# Patient Record
Sex: MALE | Race: WHITE | Employment: UNEMPLOYED | URBAN - METROPOLITAN AREA
[De-identification: names, ages, dates, MRNs, and addresses within clinical notes are randomized per-mention and may not be internally consistent; named-entity substitution may affect disease eponyms.]

---

## 2018-09-19 ENCOUNTER — HOSPITAL ENCOUNTER (INPATIENT)
Age: 43
LOS: 4 days | Discharge: PSYCHIATRIC OR SUBSTANCE USE FACILITY | DRG: 071 | End: 2018-09-23
Attending: EMERGENCY MEDICINE | Admitting: INTERNAL MEDICINE
Payer: MEDICARE

## 2018-09-19 ENCOUNTER — APPOINTMENT (OUTPATIENT)
Dept: CT IMAGING | Age: 43
DRG: 071 | End: 2018-09-19
Attending: EMERGENCY MEDICINE
Payer: MEDICARE

## 2018-09-19 ENCOUNTER — APPOINTMENT (OUTPATIENT)
Dept: GENERAL RADIOLOGY | Age: 43
DRG: 071 | End: 2018-09-19
Attending: EMERGENCY MEDICINE
Payer: MEDICARE

## 2018-09-19 ENCOUNTER — HOSPITAL ENCOUNTER (EMERGENCY)
Age: 43
Discharge: HOME | DRG: 071 | End: 2018-09-19
Attending: EMERGENCY MEDICINE
Payer: MEDICARE

## 2018-09-19 VITALS
WEIGHT: 175 LBS | DIASTOLIC BLOOD PRESSURE: 66 MMHG | HEIGHT: 66 IN | SYSTOLIC BLOOD PRESSURE: 124 MMHG | BODY MASS INDEX: 28.12 KG/M2 | HEART RATE: 80 BPM | RESPIRATION RATE: 17 BRPM | OXYGEN SATURATION: 95 % | TEMPERATURE: 98.3 F

## 2018-09-19 DIAGNOSIS — E16.2 HYPOGLYCEMIA: Primary | ICD-10-CM

## 2018-09-19 DIAGNOSIS — R41.82 ALTERED MENTAL STATUS, UNSPECIFIED ALTERED MENTAL STATUS TYPE: ICD-10-CM

## 2018-09-19 DIAGNOSIS — R41.0 DISORIENTATION: Primary | ICD-10-CM

## 2018-09-19 DIAGNOSIS — R44.3 HALLUCINATIONS: ICD-10-CM

## 2018-09-19 PROBLEM — E11.9 TYPE 2 DIABETES MELLITUS (HCC): Chronic | Status: ACTIVE | Noted: 2018-09-19

## 2018-09-19 PROBLEM — R74.8 ELEVATED LIVER ENZYMES: Status: ACTIVE | Noted: 2018-09-19

## 2018-09-19 PROBLEM — G93.40 ACUTE ENCEPHALOPATHY: Status: ACTIVE | Noted: 2018-09-19

## 2018-09-19 PROBLEM — R06.89 HYPERCARBIA: Status: ACTIVE | Noted: 2018-09-19

## 2018-09-19 PROBLEM — F32.A DEPRESSION: Chronic | Status: ACTIVE | Noted: 2018-09-19

## 2018-09-19 LAB
ALBUMIN SERPL-MCNC: 4.1 G/DL (ref 3.5–5)
ALBUMIN SERPL-MCNC: 4.2 G/DL (ref 3.5–5)
ALBUMIN/GLOB SERPL: 1 {RATIO}
ALBUMIN/GLOB SERPL: 1 {RATIO} (ref 1.2–3.5)
ALP SERPL-CCNC: 104 U/L (ref 50–136)
ALP SERPL-CCNC: 92 U/L (ref 50–136)
ALT SERPL-CCNC: 95 U/L (ref 12–65)
ALT SERPL-CCNC: 98 U/L (ref 12–65)
AMMONIA PLAS-SCNC: 16 UMOL/L (ref 11–32)
AMMONIA PLAS-SCNC: 16 UMOL/L (ref 24.9–68)
AMPHET UR QL SCN: NEGATIVE
ANION GAP SERPL CALC-SCNC: 6 MMOL/L
ANION GAP SERPL CALC-SCNC: 7 MMOL/L (ref 7–16)
APAP SERPL-MCNC: <2 UG/ML (ref 10–30)
AST SERPL-CCNC: 49 U/L (ref 15–37)
AST SERPL-CCNC: 67 U/L (ref 15–37)
ATRIAL RATE: 89 BPM
BARBITURATES UR QL SCN: NEGATIVE
BASOPHILS # BLD: 0.1 K/UL (ref 0–0.2)
BASOPHILS # BLD: 0.1 K/UL (ref 0–0.2)
BASOPHILS NFR BLD: 1 % (ref 0–2)
BASOPHILS NFR BLD: 1 % (ref 0–2)
BENZODIAZ UR QL: NEGATIVE
BILIRUB DIRECT SERPL-MCNC: 0.2 MG/DL
BILIRUB SERPL-MCNC: 0.8 MG/DL (ref 0.2–1.1)
BILIRUB SERPL-MCNC: 0.8 MG/DL (ref 0.2–1.1)
BUN SERPL-MCNC: 17 MG/DL (ref 6–23)
BUN SERPL-MCNC: 17 MG/DL (ref 6–23)
CALCIUM SERPL-MCNC: 9 MG/DL (ref 8.3–10.4)
CALCIUM SERPL-MCNC: 9.4 MG/DL (ref 8.3–10.4)
CALCULATED P AXIS, ECG09: 44 DEGREES
CALCULATED R AXIS, ECG10: 52 DEGREES
CALCULATED T AXIS, ECG11: 17 DEGREES
CANNABINOIDS UR QL SCN: NEGATIVE
CHLORIDE SERPL-SCNC: 94 MMOL/L (ref 98–107)
CHLORIDE SERPL-SCNC: 97 MMOL/L (ref 98–107)
CK SERPL-CCNC: 101 U/L (ref 21–215)
CO2 SERPL-SCNC: 33 MMOL/L (ref 21–32)
CO2 SERPL-SCNC: 36 MMOL/L (ref 21–32)
COCAINE UR QL SCN: NEGATIVE
CREAT SERPL-MCNC: 0.85 MG/DL (ref 0.8–1.5)
CREAT SERPL-MCNC: 0.96 MG/DL (ref 0.8–1.5)
DIAGNOSIS, 93000: NORMAL
DIFFERENTIAL METHOD BLD: ABNORMAL
DIFFERENTIAL METHOD BLD: ABNORMAL
EOSINOPHIL # BLD: 0 K/UL (ref 0–0.8)
EOSINOPHIL # BLD: 0 K/UL (ref 0–0.8)
EOSINOPHIL NFR BLD: 0 % (ref 0.5–7.8)
EOSINOPHIL NFR BLD: 0 % (ref 0.5–7.8)
ERYTHROCYTE [DISTWIDTH] IN BLOOD BY AUTOMATED COUNT: 12.8 %
ERYTHROCYTE [DISTWIDTH] IN BLOOD BY AUTOMATED COUNT: 12.8 %
ETHANOL SERPL-MCNC: <3 MG/DL
FOLATE SERPL-MCNC: 18.6 NG/ML (ref 3.1–17.5)
GLOBULIN SER CALC-MCNC: 4.1 G/DL (ref 2.3–3.5)
GLOBULIN SER CALC-MCNC: 4.3 G/DL (ref 2.3–3.5)
GLUCOSE BLD STRIP.AUTO-MCNC: 131 MG/DL (ref 65–100)
GLUCOSE BLD STRIP.AUTO-MCNC: 141 MG/DL (ref 65–100)
GLUCOSE BLD STRIP.AUTO-MCNC: 154 MG/DL (ref 65–100)
GLUCOSE BLD STRIP.AUTO-MCNC: 57 MG/DL (ref 65–100)
GLUCOSE BLD STRIP.AUTO-MCNC: 83 MG/DL (ref 65–100)
GLUCOSE SERPL-MCNC: 236 MG/DL (ref 65–100)
GLUCOSE SERPL-MCNC: 75 MG/DL (ref 65–100)
HCT VFR BLD AUTO: 45.6 % (ref 41.1–50.3)
HCT VFR BLD AUTO: 47 % (ref 41.1–50.3)
HGB BLD-MCNC: 15.6 G/DL (ref 13.6–17.2)
HGB BLD-MCNC: 15.8 G/DL (ref 13.6–17.2)
HIV1 P24 AG SERPL QL IA: NONREACTIVE
HIV1+2 AB SERPL QL IA: NONREACTIVE
IMM GRANULOCYTES # BLD: 0 K/UL (ref 0–0.5)
IMM GRANULOCYTES # BLD: 0.1 K/UL (ref 0–0.5)
IMM GRANULOCYTES NFR BLD AUTO: 0 % (ref 0–5)
IMM GRANULOCYTES NFR BLD AUTO: 0 % (ref 0–5)
INR PPP: 1.1
LYMPHOCYTES # BLD: 3.2 K/UL (ref 0.5–4.6)
LYMPHOCYTES # BLD: 3.4 K/UL (ref 0.5–4.6)
LYMPHOCYTES NFR BLD: 23 % (ref 13–44)
LYMPHOCYTES NFR BLD: 36 % (ref 13–44)
MCH RBC QN AUTO: 31.3 PG (ref 26.1–32.9)
MCH RBC QN AUTO: 31.8 PG (ref 26.1–32.9)
MCHC RBC AUTO-ENTMCNC: 33.6 G/DL (ref 31.4–35)
MCHC RBC AUTO-ENTMCNC: 34.2 G/DL (ref 31.4–35)
MCV RBC AUTO: 92.9 FL (ref 79.6–97.8)
MCV RBC AUTO: 93.3 FL (ref 79.6–97.8)
METHADONE UR QL: NEGATIVE
MONOCYTES # BLD: 0.9 K/UL (ref 0.1–1.3)
MONOCYTES # BLD: 1.1 K/UL (ref 0.1–1.3)
MONOCYTES NFR BLD: 10 % (ref 4–12)
MONOCYTES NFR BLD: 8 % (ref 4–12)
NEUTS SEG # BLD: 4.9 K/UL (ref 1.7–8.2)
NEUTS SEG # BLD: 9.3 K/UL (ref 1.7–8.2)
NEUTS SEG NFR BLD: 53 % (ref 43–78)
NEUTS SEG NFR BLD: 68 % (ref 43–78)
NRBC # BLD: 0 K/UL (ref 0–0.2)
NRBC # BLD: 0 K/UL (ref 0–0.2)
OPIATES UR QL: NEGATIVE
P-R INTERVAL, ECG05: 152 MS
PCP UR QL: NEGATIVE
PLATELET # BLD AUTO: 179 K/UL (ref 150–450)
PLATELET # BLD AUTO: 232 K/UL (ref 150–450)
PMV BLD AUTO: 10.3 FL (ref 9.4–12.3)
PMV BLD AUTO: 10.4 FL (ref 9.4–12.3)
POTASSIUM SERPL-SCNC: 4 MMOL/L (ref 3.5–5.1)
POTASSIUM SERPL-SCNC: 4 MMOL/L (ref 3.5–5.1)
PROT SERPL-MCNC: 8.2 G/DL
PROT SERPL-MCNC: 8.5 G/DL (ref 6.3–8.2)
PROTHROMBIN TIME: 13.3 SEC (ref 11.5–14.5)
Q-T INTERVAL, ECG07: 374 MS
QRS DURATION, ECG06: 78 MS
QTC CALCULATION (BEZET), ECG08: 455 MS
RBC # BLD AUTO: 4.91 M/UL (ref 4.23–5.6)
RBC # BLD AUTO: 5.04 M/UL (ref 4.23–5.6)
SALICYLATES SERPL-MCNC: <1.7 MG/DL (ref 2.8–20)
SODIUM SERPL-SCNC: 136 MMOL/L (ref 136–145)
SODIUM SERPL-SCNC: 137 MMOL/L (ref 136–145)
TSH SERPL DL<=0.005 MIU/L-ACNC: 2.16 UIU/ML (ref 0.36–3.74)
VENTRICULAR RATE, ECG03: 89 BPM
VIT B12 SERPL-MCNC: 494 PG/ML (ref 193–986)
WBC # BLD AUTO: 13.7 K/UL (ref 4.3–11.1)
WBC # BLD AUTO: 9.3 K/UL (ref 4.3–11.1)

## 2018-09-19 PROCEDURE — 84443 ASSAY THYROID STIM HORMONE: CPT

## 2018-09-19 PROCEDURE — 87389 HIV-1 AG W/HIV-1&-2 AB AG IA: CPT

## 2018-09-19 PROCEDURE — 80053 COMPREHEN METABOLIC PANEL: CPT

## 2018-09-19 PROCEDURE — 80074 ACUTE HEPATITIS PANEL: CPT

## 2018-09-19 PROCEDURE — 82140 ASSAY OF AMMONIA: CPT

## 2018-09-19 PROCEDURE — 74011000258 HC RX REV CODE- 258: Performed by: EMERGENCY MEDICINE

## 2018-09-19 PROCEDURE — 77030011943

## 2018-09-19 PROCEDURE — 80307 DRUG TEST PRSMV CHEM ANLYZR: CPT

## 2018-09-19 PROCEDURE — 96375 TX/PRO/DX INJ NEW DRUG ADDON: CPT | Performed by: EMERGENCY MEDICINE

## 2018-09-19 PROCEDURE — 87040 BLOOD CULTURE FOR BACTERIA: CPT

## 2018-09-19 PROCEDURE — 71045 X-RAY EXAM CHEST 1 VIEW: CPT

## 2018-09-19 PROCEDURE — 85025 COMPLETE CBC W/AUTO DIFF WBC: CPT

## 2018-09-19 PROCEDURE — 85610 PROTHROMBIN TIME: CPT

## 2018-09-19 PROCEDURE — 80076 HEPATIC FUNCTION PANEL: CPT

## 2018-09-19 PROCEDURE — 82746 ASSAY OF FOLIC ACID SERUM: CPT

## 2018-09-19 PROCEDURE — 80048 BASIC METABOLIC PNL TOTAL CA: CPT

## 2018-09-19 PROCEDURE — 81003 URINALYSIS AUTO W/O SCOPE: CPT | Performed by: EMERGENCY MEDICINE

## 2018-09-19 PROCEDURE — 74011250636 HC RX REV CODE- 250/636: Performed by: EMERGENCY MEDICINE

## 2018-09-19 PROCEDURE — 74011250636 HC RX REV CODE- 250/636: Performed by: INTERNAL MEDICINE

## 2018-09-19 PROCEDURE — 74011000250 HC RX REV CODE- 250: Performed by: INTERNAL MEDICINE

## 2018-09-19 PROCEDURE — 99285 EMERGENCY DEPT VISIT HI MDM: CPT | Performed by: EMERGENCY MEDICINE

## 2018-09-19 PROCEDURE — 93005 ELECTROCARDIOGRAM TRACING: CPT | Performed by: EMERGENCY MEDICINE

## 2018-09-19 PROCEDURE — 84425 ASSAY OF VITAMIN B-1: CPT

## 2018-09-19 PROCEDURE — 70450 CT HEAD/BRAIN W/O DYE: CPT

## 2018-09-19 PROCEDURE — 82550 ASSAY OF CK (CPK): CPT

## 2018-09-19 PROCEDURE — 51701 INSERT BLADDER CATHETER: CPT | Performed by: EMERGENCY MEDICINE

## 2018-09-19 PROCEDURE — 74011000250 HC RX REV CODE- 250

## 2018-09-19 PROCEDURE — 96372 THER/PROPH/DIAG INJ SC/IM: CPT | Performed by: EMERGENCY MEDICINE

## 2018-09-19 PROCEDURE — 65270000029 HC RM PRIVATE

## 2018-09-19 PROCEDURE — 0T9B70Z DRAINAGE OF BLADDER WITH DRAINAGE DEVICE, VIA NATURAL OR ARTIFICIAL OPENING: ICD-10-PCS | Performed by: EMERGENCY MEDICINE

## 2018-09-19 PROCEDURE — 82962 GLUCOSE BLOOD TEST: CPT

## 2018-09-19 PROCEDURE — 82607 VITAMIN B-12: CPT

## 2018-09-19 PROCEDURE — 99284 EMERGENCY DEPT VISIT MOD MDM: CPT | Performed by: EMERGENCY MEDICINE

## 2018-09-19 PROCEDURE — 96374 THER/PROPH/DIAG INJ IV PUSH: CPT | Performed by: EMERGENCY MEDICINE

## 2018-09-19 RX ORDER — SODIUM CHLORIDE 0.9 % (FLUSH) 0.9 %
5-10 SYRINGE (ML) INJECTION EVERY 8 HOURS
Status: DISCONTINUED | OUTPATIENT
Start: 2018-09-19 | End: 2018-09-23

## 2018-09-19 RX ORDER — ENOXAPARIN SODIUM 100 MG/ML
40 INJECTION SUBCUTANEOUS EVERY 24 HOURS
Status: DISCONTINUED | OUTPATIENT
Start: 2018-09-19 | End: 2018-09-23 | Stop reason: HOSPADM

## 2018-09-19 RX ORDER — LORAZEPAM 2 MG/ML
1 INJECTION INTRAMUSCULAR
Status: DISCONTINUED | OUTPATIENT
Start: 2018-09-19 | End: 2018-09-19

## 2018-09-19 RX ORDER — THIAMINE HYDROCHLORIDE 100 MG/ML
100 INJECTION, SOLUTION INTRAMUSCULAR; INTRAVENOUS
Status: DISCONTINUED | OUTPATIENT
Start: 2018-09-19 | End: 2018-09-19 | Stop reason: SDUPTHER

## 2018-09-19 RX ORDER — HALOPERIDOL 5 MG/ML
5 INJECTION INTRAMUSCULAR
Status: DISCONTINUED | OUTPATIENT
Start: 2018-09-19 | End: 2018-09-23 | Stop reason: HOSPADM

## 2018-09-19 RX ORDER — ONDANSETRON 2 MG/ML
4 INJECTION INTRAMUSCULAR; INTRAVENOUS
Status: DISCONTINUED | OUTPATIENT
Start: 2018-09-19 | End: 2018-09-23 | Stop reason: HOSPADM

## 2018-09-19 RX ORDER — SODIUM CHLORIDE 0.9 % (FLUSH) 0.9 %
5-10 SYRINGE (ML) INJECTION AS NEEDED
Status: DISCONTINUED | OUTPATIENT
Start: 2018-09-19 | End: 2018-09-23 | Stop reason: HOSPADM

## 2018-09-19 RX ORDER — LORAZEPAM 2 MG/ML
1 INJECTION INTRAMUSCULAR
Status: COMPLETED | OUTPATIENT
Start: 2018-09-19 | End: 2018-09-19

## 2018-09-19 RX ORDER — DEXTROSE 50 % IN WATER (D50W) INTRAVENOUS SYRINGE
Status: COMPLETED
Start: 2018-09-19 | End: 2018-09-19

## 2018-09-19 RX ORDER — LORAZEPAM 2 MG/ML
2 INJECTION INTRAMUSCULAR
Status: COMPLETED | OUTPATIENT
Start: 2018-09-19 | End: 2018-09-19

## 2018-09-19 RX ORDER — DEXTROSE MONOHYDRATE 100 MG/ML
150 INJECTION, SOLUTION INTRAVENOUS CONTINUOUS
Status: DISCONTINUED | OUTPATIENT
Start: 2018-09-19 | End: 2018-09-20

## 2018-09-19 RX ORDER — LORAZEPAM 2 MG/ML
1 INJECTION INTRAMUSCULAR ONCE
Status: COMPLETED | OUTPATIENT
Start: 2018-09-19 | End: 2018-09-19

## 2018-09-19 RX ORDER — LORAZEPAM 2 MG/ML
1 INJECTION INTRAMUSCULAR
Status: DISCONTINUED | OUTPATIENT
Start: 2018-09-19 | End: 2018-09-23 | Stop reason: HOSPADM

## 2018-09-19 RX ORDER — LORAZEPAM 2 MG/ML
1 INJECTION INTRAMUSCULAR
Status: DISCONTINUED | OUTPATIENT
Start: 2018-09-19 | End: 2018-09-19 | Stop reason: SDUPTHER

## 2018-09-19 RX ADMIN — LORAZEPAM 1 MG: 2 INJECTION INTRAMUSCULAR; INTRAVENOUS at 16:14

## 2018-09-19 RX ADMIN — LORAZEPAM 1 MG: 2 INJECTION INTRAMUSCULAR; INTRAVENOUS at 15:26

## 2018-09-19 RX ADMIN — LORAZEPAM 2 MG: 2 INJECTION INTRAMUSCULAR; INTRAVENOUS at 06:40

## 2018-09-19 RX ADMIN — DEXTROSE MONOHYDRATE 25 G: 25 INJECTION, SOLUTION INTRAVENOUS at 15:26

## 2018-09-19 RX ADMIN — HALOPERIDOL LACTATE 5 MG: 5 INJECTION, SOLUTION INTRAMUSCULAR at 18:35

## 2018-09-19 RX ADMIN — WATER 10 MG: 1 INJECTION INTRAMUSCULAR; INTRAVENOUS; SUBCUTANEOUS at 16:42

## 2018-09-19 RX ADMIN — LORAZEPAM 1 MG: 2 INJECTION INTRAMUSCULAR; INTRAVENOUS at 19:10

## 2018-09-19 RX ADMIN — DEXTROSE MONOHYDRATE 150 ML/HR: 10 INJECTION, SOLUTION INTRAVENOUS at 15:42

## 2018-09-19 RX ADMIN — THIAMINE HYDROCHLORIDE 100 MG: 100 INJECTION, SOLUTION INTRAMUSCULAR; INTRAVENOUS at 16:44

## 2018-09-19 NOTE — ED NOTES
I have reviewed discharge instructions with the caregiver. The caregiver verbalized understanding. Patient left ED via Discharge Method: stretcher to [facility] with (insert name of family/friend, self, transport with Rolan Monzon back to [facility]). Opportunity for questions and clarification provided. Patient given 0 scripts. To continue your aftercare when you leave the hospital, you may receive an automated call from our care team to check in on how you are doing. This is a free service and part of our promise to provide the best care and service to meet your aftercare needs.  If you have questions, or wish to unsubscribe from this service please call [phone]. Thank you for Choosing our [employer] Emergency Department.

## 2018-09-19 NOTE — ROUTINE PROCESS
TRANSFER - OUT REPORT: 
 
Verbal report given to glenn(name) on Deb Cote  being transferred to [facility](unit) for routine progression of care Report consisted of patients Situation, Background, Assessment and  
Recommendations(SBAR). Information from the following report(s) SBAR was reviewed with the receiving nurse. Lines:  
Peripheral IV 09/19/18 Right Antecubital (Active) Site Assessment Clean, dry, & intact 9/19/2018  3:19 PM  
Phlebitis Assessment 0 9/19/2018  3:19 PM  
Infiltration Assessment 0 9/19/2018  3:19 PM  
Dressing Status Clean, dry, & intact 9/19/2018  3:19 PM  
  
 
Opportunity for questions and clarification was provided. Patient transported with: 
 [employer]

## 2018-09-19 NOTE — ED PROVIDER NOTES
HPI Comments: 57-year-old male has been a resident inpatient psychiatric facility for 7 days. This was due to depression and suicidal ideations. Patient has a history of some methadone use in the past.  Also states he's been on Xanax for years and years. He states his main problem is that he is coming down off of Xanax. He appears to be somewhat disoriented and has a poor historian. Physician assistant psychiatric facility said the patient was alert and oriented for the first 6 days but over the last 24 hours has become increasing confused and agitated with some delusions and hallucinations. Patient occasionally not making sense when he talks. His medications have been adjusted in the last day or 2. Patient is a 43 y.o. male presenting with altered mental status. The history is provided by the patient, the EMS personnel and medical records. The history is limited by the condition of the patient. Altered mental status This is a new problem. The current episode started yesterday. The problem has not changed since onset. Associated symptoms include confusion, agitation, delusions and hallucinations. Mental status baseline is normal.  His past medical history is significant for diabetes, depression and psychotropic medication treatment. His past medical history does not include seizures, liver disease, CVA, TIA, hypertension, head trauma or heart disease. No past medical history on file. No past surgical history on file. No family history on file. Social History Social History  Marital status: SINGLE Spouse name: N/A  
 Number of children: N/A  
 Years of education: N/A Occupational History  Not on file. Social History Main Topics  Smoking status: Not on file  Smokeless tobacco: Not on file  Alcohol use Not on file  Drug use: Not on file  Sexual activity: Not on file Other Topics Concern  Not on file Social History Narrative ALLERGIES: Review of patient's allergies indicates not on file. Review of Systems Unable to perform ROS: Mental status change Constitutional: Negative for chills and fever. Respiratory: Negative for shortness of breath. Cardiovascular: Negative for chest pain. Gastrointestinal: Negative for abdominal pain, nausea and vomiting. Neurological: Negative for headaches. Psychiatric/Behavioral: Positive for agitation, confusion, hallucinations, sleep disturbance and suicidal ideas. The patient is nervous/anxious. There were no vitals filed for this visit. Physical Exam  
Constitutional: He appears well-developed and well-nourished. No distress. HENT:  
Head: Normocephalic and atraumatic. Right Ear: External ear normal.  
Left Ear: External ear normal.  
Mouth/Throat: Oropharynx is clear and moist. No oropharyngeal exudate. Eyes: Conjunctivae and EOM are normal. Pupils are equal, round, and reactive to light. Neck: Normal range of motion. Neck supple. Cardiovascular: Regular rhythm and intact distal pulses. Tachycardia present. No murmur heard. Pulmonary/Chest: Breath sounds normal. No respiratory distress. Abdominal: Soft. Bowel sounds are normal. He exhibits no mass. There is no tenderness. There is no rebound and no guarding. No hernia. Neurological: He is alert. He is disoriented. Gait normal. GCS eye subscore is 4. GCS verbal subscore is 4. GCS motor subscore is 6. Reflex Scores: 
     Bicep reflexes are 1+ on the right side and 1+ on the left side. Patellar reflexes are 1+ on the right side and 1+ on the left side. Pressured speech. Slightly slurred. No focal weakness. No hyperreflexia or clonus. Patient thinks it is  August and he is at a doctor's office. He does know the year. Follow commands. No focal weakness or drift. Skin: Skin is warm and dry. Psychiatric: He has a normal mood and affect.  His speech is normal.  
 Nursing note and vitals reviewed. MDM Number of Diagnoses or Management Options Diagnosis management comments: Delirium. Check head CT. Check for infection or electrolyte abnormalities. Possible withdrawal symptoms. Possibly related to medication. No obvious evidence for serotonin syndrome or old neuroleptic malignant Amount and/or Complexity of Data Reviewed Clinical lab tests: ordered and reviewed Tests in the radiology section of CPT®: ordered and reviewed Tests in the medicine section of CPT®: ordered and reviewed Independent visualization of images, tracings, or specimens: yes Risk of Complications, Morbidity, and/or Mortality Presenting problems: moderate Diagnostic procedures: low Management options: moderate Patient Progress Patient progress: stable ED Course Procedures Results Include: 
 
Recent Results (from the past 24 hour(s)) CBC WITH AUTOMATED DIFF Collection Time: 09/19/18  5:58 AM  
Result Value Ref Range WBC 9.3 4.3 - 11.1 K/uL  
 RBC 5.04 4.23 - 5.6 M/uL  
 HGB 15.8 13.6 - 17.2 g/dL HCT 47.0 41.1 - 50.3 % MCV 93.3 79.6 - 97.8 FL  
 MCH 31.3 26.1 - 32.9 PG  
 MCHC 33.6 31.4 - 35.0 g/dL  
 RDW 12.8 % PLATELET 321 216 - 527 K/uL MPV 10.4 9.4 - 12.3 FL ABSOLUTE NRBC 0.00 0.0 - 0.2 K/uL  
 DF AUTOMATED NEUTROPHILS 53 43 - 78 % LYMPHOCYTES 36 13 - 44 % MONOCYTES 10 4.0 - 12.0 % EOSINOPHILS 0 (L) 0.5 - 7.8 % BASOPHILS 1 0.0 - 2.0 % IMMATURE GRANULOCYTES 0 0.0 - 5.0 %  
 ABS. NEUTROPHILS 4.9 1.7 - 8.2 K/UL  
 ABS. LYMPHOCYTES 3.4 0.5 - 4.6 K/UL  
 ABS. MONOCYTES 0.9 0.1 - 1.3 K/UL  
 ABS. EOSINOPHILS 0.0 0.0 - 0.8 K/UL  
 ABS. BASOPHILS 0.1 0.0 - 0.2 K/UL  
 ABS. IMM. GRANS. 0.0 0.0 - 0.5 K/UL METABOLIC PANEL, COMPREHENSIVE Collection Time: 09/19/18  5:58 AM  
Result Value Ref Range Sodium 136 136 - 145 mmol/L Potassium 4.0 3.5 - 5.1 mmol/L  Chloride 94 (L) 98 - 107 mmol/L  
 CO2 36 (H) 21 - 32 mmol/L Anion gap 6 mmol/L Glucose 236 (H) 65 - 100 mg/dL BUN 17 6 - 23 MG/DL Creatinine 0.96 0.8 - 1.5 MG/DL  
 GFR est AA >60 >60 ml/min/1.73m2 GFR est non-AA >60 ml/min/1.73m2 Calcium 9.0 8.3 - 10.4 MG/DL Bilirubin, total 0.8 0.2 - 1.1 MG/DL  
 ALT (SGPT) 95 (H) 12 - 65 U/L  
 AST (SGOT) 49 (H) 15 - 37 U/L Alk. phosphatase 104 50 - 136 U/L Protein, total 8.2 g/dL Albumin 4.1 3.5 - 5.0 g/dL Globulin 4.1 (H) 2.3 - 3.5 g/dL A-G Ratio 1.0 AMMONIA Collection Time: 09/19/18  5:58 AM  
Result Value Ref Range Ammonia 16 (L) 24.9 - 68 UMOL/L  
CK Collection Time: 09/19/18  5:58 AM  
Result Value Ref Range  21 - 215 U/L  
EKG, 12 LEAD, INITIAL Collection Time: 09/19/18  7:21 AM  
Result Value Ref Range Ventricular Rate 89 BPM  
 Atrial Rate 89 BPM  
 P-R Interval 152 ms QRS Duration 78 ms Q-T Interval 374 ms QTC Calculation (Bezet) 455 ms Calculated P Axis 44 degrees Calculated R Axis 52 degrees Calculated T Axis 17 degrees Diagnosis Sinus rhythm with Premature atrial complexes with Aberrant conduction Otherwise normal ECG When compared with ECG of 19-SEP-2018 06:30, Sinus rhythm has replaced Atrial fibrillation Questionable change in QRS duration Borderline criteria for Lateral infarct are no longer Present Results Include: 
 
Recent Results (from the past 24 hour(s)) CBC WITH AUTOMATED DIFF Collection Time: 09/19/18  5:58 AM  
Result Value Ref Range WBC 9.3 4.3 - 11.1 K/uL  
 RBC 5.04 4.23 - 5.6 M/uL  
 HGB 15.8 13.6 - 17.2 g/dL HCT 47.0 41.1 - 50.3 % MCV 93.3 79.6 - 97.8 FL  
 MCH 31.3 26.1 - 32.9 PG  
 MCHC 33.6 31.4 - 35.0 g/dL  
 RDW 12.8 % PLATELET 649 663 - 019 K/uL MPV 10.4 9.4 - 12.3 FL ABSOLUTE NRBC 0.00 0.0 - 0.2 K/uL  
 DF AUTOMATED NEUTROPHILS 53 43 - 78 % LYMPHOCYTES 36 13 - 44 % MONOCYTES 10 4.0 - 12.0 % EOSINOPHILS 0 (L) 0.5 - 7.8 % BASOPHILS 1 0.0 - 2.0 % IMMATURE GRANULOCYTES 0 0.0 - 5.0 %  
 ABS. NEUTROPHILS 4.9 1.7 - 8.2 K/UL  
 ABS. LYMPHOCYTES 3.4 0.5 - 4.6 K/UL  
 ABS. MONOCYTES 0.9 0.1 - 1.3 K/UL  
 ABS. EOSINOPHILS 0.0 0.0 - 0.8 K/UL  
 ABS. BASOPHILS 0.1 0.0 - 0.2 K/UL  
 ABS. IMM. GRANS. 0.0 0.0 - 0.5 K/UL METABOLIC PANEL, COMPREHENSIVE Collection Time: 09/19/18  5:58 AM  
Result Value Ref Range Sodium 136 136 - 145 mmol/L Potassium 4.0 3.5 - 5.1 mmol/L Chloride 94 (L) 98 - 107 mmol/L  
 CO2 36 (H) 21 - 32 mmol/L Anion gap 6 mmol/L Glucose 236 (H) 65 - 100 mg/dL BUN 17 6 - 23 MG/DL Creatinine 0.96 0.8 - 1.5 MG/DL  
 GFR est AA >60 >60 ml/min/1.73m2 GFR est non-AA >60 ml/min/1.73m2 Calcium 9.0 8.3 - 10.4 MG/DL Bilirubin, total 0.8 0.2 - 1.1 MG/DL  
 ALT (SGPT) 95 (H) 12 - 65 U/L  
 AST (SGOT) 49 (H) 15 - 37 U/L Alk. phosphatase 104 50 - 136 U/L Protein, total 8.2 g/dL Albumin 4.1 3.5 - 5.0 g/dL Globulin 4.1 (H) 2.3 - 3.5 g/dL A-G Ratio 1.0 AMMONIA Collection Time: 09/19/18  5:58 AM  
Result Value Ref Range Ammonia 16 (L) 24.9 - 68 UMOL/L  
CK Collection Time: 09/19/18  5:58 AM  
Result Value Ref Range  21 - 215 U/L  
EKG, 12 LEAD, INITIAL Collection Time: 09/19/18  7:21 AM  
Result Value Ref Range Ventricular Rate 89 BPM  
 Atrial Rate 89 BPM  
 P-R Interval 152 ms QRS Duration 78 ms Q-T Interval 374 ms QTC Calculation (Bezet) 455 ms Calculated P Axis 44 degrees Calculated R Axis 52 degrees Calculated T Axis 17 degrees Diagnosis Sinus rhythm with Premature atrial complexes with Aberrant conduction Otherwise normal ECG When compared with ECG of 19-SEP-2018 06:30, Sinus rhythm has replaced Atrial fibrillation Questionable change in QRS duration Borderline criteria for Lateral infarct are no longer Present As best I can tell, MAR from psychiatric facility states patient is on Suboxone, 4 mg 4 times a day, Cipro, clonidine 0.0 mg 3 times a day, Cymbalta 60 mg, Neurontin 300 mg 3 times a day, 5 mg Haldol twice a day, insulin, Lamictal, Ativan 1 mg 3 times a day, Inderal 3 times a day, trazodone 50 mg at bedtime. Other than medication related, no obvious cause for patient's mental status changes. No fever. Do not believe lumbar puncture indicated.

## 2018-09-19 NOTE — DISCHARGE INSTRUCTIONS
The patient's medications, do not see any obvious cause for patient's mental status changes. No evidence for serotonin syndrome nor neuroleptic malignant syndrome. Perhaps benzodiazepine use is more severe than suspected and may need higher doses of benzodiazepines. Recheck for worsening symptoms. Results Include:    Recent Results (from the past 24 hour(s))   CBC WITH AUTOMATED DIFF    Collection Time: 09/19/18  5:58 AM   Result Value Ref Range    WBC 9.3 4.3 - 11.1 K/uL    RBC 5.04 4.23 - 5.6 M/uL    HGB 15.8 13.6 - 17.2 g/dL    HCT 47.0 41.1 - 50.3 %    MCV 93.3 79.6 - 97.8 FL    MCH 31.3 26.1 - 32.9 PG    MCHC 33.6 31.4 - 35.0 g/dL    RDW 12.8 %    PLATELET 569 550 - 220 K/uL    MPV 10.4 9.4 - 12.3 FL    ABSOLUTE NRBC 0.00 0.0 - 0.2 K/uL    DF AUTOMATED      NEUTROPHILS 53 43 - 78 %    LYMPHOCYTES 36 13 - 44 %    MONOCYTES 10 4.0 - 12.0 %    EOSINOPHILS 0 (L) 0.5 - 7.8 %    BASOPHILS 1 0.0 - 2.0 %    IMMATURE GRANULOCYTES 0 0.0 - 5.0 %    ABS. NEUTROPHILS 4.9 1.7 - 8.2 K/UL    ABS. LYMPHOCYTES 3.4 0.5 - 4.6 K/UL    ABS. MONOCYTES 0.9 0.1 - 1.3 K/UL    ABS. EOSINOPHILS 0.0 0.0 - 0.8 K/UL    ABS. BASOPHILS 0.1 0.0 - 0.2 K/UL    ABS. IMM. GRANS. 0.0 0.0 - 0.5 K/UL   METABOLIC PANEL, COMPREHENSIVE    Collection Time: 09/19/18  5:58 AM   Result Value Ref Range    Sodium 136 136 - 145 mmol/L    Potassium 4.0 3.5 - 5.1 mmol/L    Chloride 94 (L) 98 - 107 mmol/L    CO2 36 (H) 21 - 32 mmol/L    Anion gap 6 mmol/L    Glucose 236 (H) 65 - 100 mg/dL    BUN 17 6 - 23 MG/DL    Creatinine 0.96 0.8 - 1.5 MG/DL    GFR est AA >60 >60 ml/min/1.73m2    GFR est non-AA >60 ml/min/1.73m2    Calcium 9.0 8.3 - 10.4 MG/DL    Bilirubin, total 0.8 0.2 - 1.1 MG/DL    ALT (SGPT) 95 (H) 12 - 65 U/L    AST (SGOT) 49 (H) 15 - 37 U/L    Alk.  phosphatase 104 50 - 136 U/L    Protein, total 8.2 g/dL    Albumin 4.1 3.5 - 5.0 g/dL    Globulin 4.1 (H) 2.3 - 3.5 g/dL    A-G Ratio 1.0     AMMONIA    Collection Time: 09/19/18  5:58 AM   Result Value Ref Range    Ammonia 16 (L) 24.9 - 68 UMOL/L   CK    Collection Time: 09/19/18  5:58 AM   Result Value Ref Range     21 - 215 U/L   EKG, 12 LEAD, INITIAL    Collection Time: 09/19/18  7:21 AM   Result Value Ref Range    Ventricular Rate 89 BPM    Atrial Rate 89 BPM    P-R Interval 152 ms    QRS Duration 78 ms    Q-T Interval 374 ms    QTC Calculation (Bezet) 455 ms    Calculated P Axis 44 degrees    Calculated R Axis 52 degrees    Calculated T Axis 17 degrees    Diagnosis       Sinus rhythm with Premature atrial complexes with Aberrant conduction  Otherwise normal ECG  When compared with ECG of 19-SEP-2018 06:30,  Sinus rhythm has replaced Atrial fibrillation  Questionable change in QRS duration  Borderline criteria for Lateral infarct are no longer Present       Ct Head Wo Cont    Result Date: 9/19/2018  Noncontrast CT of the brain. COMPARISON: none INDICATION: Delirium TECHNIQUE: Contiguous axial images were obtained from the skull base through the vertex without IV contrast. Radiation dose reduction techniques were used for this study:  Our CT scanners use one or all of the following: Automated exposure control, adjustment of the mA and/or kVp according to patient's size, iterative reconstruction. FINDINGS: There is no acute intracranial hemorrhage or evidence for acute territorial infarction. There is no mass effect, midline shift or hydrocephalus. There is no extra-axial fluid collection. The cerebellum and brainstem are grossly unremarkable. Included orbits and the globes are unremarkable. Postsurgical changes in the maxillary sinuses. Mastoid air cells are aerated. Surrounding bones are intact. IMPRESSION: 1. No evidence of acute intracranial abnormality. No hydrocephalus. Xr Chest Port    Result Date: 9/19/2018  Portable chest xray  COMPARISON: none. INDICATION: chest pain FINDINGS: No pulmonary consolidation, pleural effusion or pneumothorax. No pulmonary edema.  Cardiomediastinal contour is within normal limits. Old healed left-sided rib fractures are noted. IMPRESSION: Negative chest x-ray.

## 2018-09-19 NOTE — ED NOTES
TRANSFER - OUT REPORT: 
 
Verbal report given to MARISSA De Anda on Ramy Mota  being transferred to  for routine progression of care Report consisted of patients Situation, Background, Assessment and  
Recommendations(SBAR). Information from the following report(s) SBAR was reviewed with the receiving nurse. Lines:  
Peripheral IV 09/19/18 Right Antecubital (Active) Site Assessment Clean, dry, & intact 9/19/2018  3:19 PM  
Phlebitis Assessment 0 9/19/2018  3:19 PM  
Infiltration Assessment 0 9/19/2018  3:19 PM  
Dressing Status Clean, dry, & intact 9/19/2018  3:19 PM  
  
 
Opportunity for questions and clarification was provided. Patient transported with: 
 [email or web address]

## 2018-09-19 NOTE — ED PROVIDER NOTES
HPI Comments: 27-year-old white male admitted to [facility] 9/10 for suicidal ideation and has a history of drug abuse was sent this morning [facility] anxiety. Altered mental status. Workup at that time was unremarkable and patient was returned to the facility. He is continued to decline and is now incoherent. EMS found him with a stick glucose of  36. They treated him with 1 amp of D50. Repeat blood sugar 160. In spite of the improvement and glucose patient remains incoherent and somewhat combative although his combativeness does not appear to be purposeful. History from the patient is not possible due to his speech being appearance, disorganized word salad. According to the [facility] he has not received any insulin today but did receive 8 units of NovoLog yesterday at 11:40 AM, 18 units of NovoLog at 4:30 PM and 12 units of NovoLog at 8:30 PM.  He also received 15 units of Lantus at 9:43 AM and 12 units of Lantus at 8:30 PM yesterday. Patient is a 43 y.o. male presenting with altered mental status. The history is provided by the patient. Altered mental status History reviewed. No pertinent past medical history. History reviewed. No pertinent surgical history. History reviewed. No pertinent family history. Social History Social History  Marital status: SINGLE Spouse name: N/A  
 Number of children: N/A  
 Years of education: N/A Occupational History  Not on file. Social History Main Topics  Smoking status: Not on file  Smokeless tobacco: Not on file  Alcohol use Not on file  Drug use: Not on file  Sexual activity: Not on file Other Topics Concern  Not on file Social History Narrative  No narrative on file ALLERGIES: Toradol [ketorolac] and Tramadol Review of Systems Unable to perform ROS: Mental status change Vitals:  
 09/19/18 1502 BP: 118/88 Pulse: 99 Resp: 18 Temp: 98.9 °F (37.2 °C) SpO2: 94% Weight: 81.6 kg (180 lb) Height: 5' 6\" (1.676 m) Physical Exam  
Constitutional: He appears well-developed and well-nourished. Awake but agitated HENT:  
Head: Normocephalic and atraumatic. Mouth/Throat: Oropharynx is clear and moist.  
Eyes: Conjunctivae are normal. Pupils are equal, round, and reactive to light. No scleral icterus. Neck: Normal range of motion. Neck supple. Cardiovascular: Normal rate and regular rhythm. No murmur heard. Pulmonary/Chest: Effort normal and breath sounds normal. He has no wheezes. Abdominal: Soft. He exhibits no distension. There is no tenderness. Musculoskeletal: Normal range of motion. He exhibits no edema. Neurological: He is alert. Moving all extremities. Patient has disorganized incoherent speech. Unable to assess orientation. Skin: Skin is warm and dry. Psychiatric: He has a normal mood and affect. Nursing note and vitals reviewed. MDM Number of Diagnoses or Management Options Diagnosis management comments: Patient had continued episodes of hypoglycemia. He was placed on a D10 drip. Lab work unremarkable. Head CT and chest x-ray not repeated as they were just done a few hours ago on previous ER visit. Hospitalist consulted for admission. Patient has required 2 mg of Ativan for continued agitation and yelling. Amount and/or Complexity of Data Reviewed Clinical lab tests: ordered and reviewed Tests in the medicine section of CPT®: ordered and reviewed Discuss the patient with other providers: yes Risk of Complications, Morbidity, and/or Mortality Presenting problems: moderate Diagnostic procedures: moderate Management options: moderate ED Course Procedures

## 2018-09-19 NOTE — ED TRIAGE NOTES
Pt arrives per EMS from [facility]. EMS found pt altered, combative and had a blood sugar of 37. Given Amp dextrose and recheck 160.  Pt confused and swinging at staff on arrival.

## 2018-09-19 NOTE — H&P
Hospitalist H&P Note Admit Date:  2018  2:56 PM  
Name:  Epi Chakraborty Age:  43 y.o. 
:  1975 MRN:  [id number] PCP:  Not On File [facility] Treatment Team: Attending Provider: Jaclyn Wasserman MD; Primary Nurse: Mindy Olmstead RN 
 
HPI:  
 
CC: confusion Mr. Denice Pope is a 42 yo male with PMH of anxiety/depression admitted to [facility] for suicidal ideation and xanax detox who is evaluated for the 2nd time today with worsening confusion. He has apparently been weaned off xanax and then developed confusion with first ED workup being unrevealing with negative CT head, labs. He returned to [facility] and developed worsening confusion with hypoglycemia in the setting of DM2, he has been receiving insulin, prn ativan and haldol. He is presently in 4 point restraints, agitated and kicking and yelling. Glucose has improved with D50 and now on D10 IVF. He received IV thiamine prior to dextrose. UDS pending, ETOH negative, asa/tylenol negative. He has mild leukocytosis, CXR negative, pending UA, LFTS elevated. EKG from earlier today with normal QTC 
 
10 ROS not possible due to mentation History reviewed. No pertinent past medical history. - anxiety, depression History reviewed. No pertinent surgical history. Allergies Allergen Reactions  Toradol [Ketorolac] Unknown (comments)  Tramadol Other (comments) Social History Substance Use Topics  Smoking status: Not on file  Smokeless tobacco: Not on file  Alcohol use Not on file  
  
family history. - unable to obtain There is no immunization history for the selected administration types on file for this patient. PTA Medications: 
None Objective:  
 
Patient Vitals for the past 24 hrs: 
 Temp Pulse Resp BP SpO2  
18 1502 98.9 °F (37.2 °C) 99 18 118/88 94 % Oxygen Therapy O2 Sat (%): 94 % (18 1502) O2 Device: Room air (18 1502) No intake or output data in the 24 hours ending 09/19/18 1702 Physical Exam: 
General:    Alert. Agitated, yelling and kicking , in restraints Eyes:   Normal sclera. Extraocular movements intact. PERRLA 
ENT:  Normocephalic, atraumatic. Dry mucous membranes CV:   RRR. No m/r/g. No edema Lungs:  CTAB. No wheezing, rhonchi, or rales. Anterior exam  
Abdomen: Soft, nontender, nondistended. Present BS Extremities: Warm and dry. . 
Neurologic:  grossly intact. Moving all extremities Skin:     No rashes or jaundice. Normal coloration Psych:  Agitated I reviewed the labs, imaging, EKGs, telemetry, and other studies done this admission. Data Review:  
Recent Results (from the past 24 hour(s)) CBC WITH AUTOMATED DIFF Collection Time: 09/19/18  5:58 AM  
Result Value Ref Range WBC 9.3 4.3 - 11.1 K/uL  
 RBC 5.04 4.23 - 5.6 M/uL  
 HGB 15.8 13.6 - 17.2 g/dL HCT 47.0 41.1 - 50.3 % MCV 93.3 79.6 - 97.8 FL  
 MCH 31.3 26.1 - 32.9 PG  
 MCHC 33.6 31.4 - 35.0 g/dL  
 RDW 12.8 % PLATELET 001 454 - 599 K/uL MPV 10.4 9.4 - 12.3 FL ABSOLUTE NRBC 0.00 0.0 - 0.2 K/uL  
 DF AUTOMATED NEUTROPHILS 53 43 - 78 % LYMPHOCYTES 36 13 - 44 % MONOCYTES 10 4.0 - 12.0 % EOSINOPHILS 0 (L) 0.5 - 7.8 % BASOPHILS 1 0.0 - 2.0 % IMMATURE GRANULOCYTES 0 0.0 - 5.0 %  
 ABS. NEUTROPHILS 4.9 1.7 - 8.2 K/UL  
 ABS. LYMPHOCYTES 3.4 0.5 - 4.6 K/UL  
 ABS. MONOCYTES 0.9 0.1 - 1.3 K/UL  
 ABS. EOSINOPHILS 0.0 0.0 - 0.8 K/UL  
 ABS. BASOPHILS 0.1 0.0 - 0.2 K/UL  
 ABS. IMM. GRANS. 0.0 0.0 - 0.5 K/UL METABOLIC PANEL, COMPREHENSIVE Collection Time: 09/19/18  5:58 AM  
Result Value Ref Range Sodium 136 136 - 145 mmol/L Potassium 4.0 3.5 - 5.1 mmol/L Chloride 94 (L) 98 - 107 mmol/L  
 CO2 36 (H) 21 - 32 mmol/L Anion gap 6 mmol/L Glucose 236 (H) 65 - 100 mg/dL BUN 17 6 - 23 MG/DL Creatinine 0.96 0.8 - 1.5 MG/DL  
 GFR est AA >60 >60 ml/min/1.73m2 GFR est non-AA >60 ml/min/1.73m2 Calcium 9.0 8.3 - 10.4 MG/DL Bilirubin, total 0.8 0.2 - 1.1 MG/DL  
 ALT (SGPT) 95 (H) 12 - 65 U/L  
 AST (SGOT) 49 (H) 15 - 37 U/L Alk. phosphatase 104 50 - 136 U/L Protein, total 8.2 g/dL Albumin 4.1 3.5 - 5.0 g/dL Globulin 4.1 (H) 2.3 - 3.5 g/dL A-G Ratio 1.0 AMMONIA Collection Time: 09/19/18  5:58 AM  
Result Value Ref Range Ammonia 16 (L) 24.9 - 68 UMOL/L  
CK Collection Time: 09/19/18  5:58 AM  
Result Value Ref Range  21 - 215 U/L  
EKG, 12 LEAD, INITIAL Collection Time: 09/19/18  7:21 AM  
Result Value Ref Range Ventricular Rate 89 BPM  
 Atrial Rate 89 BPM  
 P-R Interval 152 ms QRS Duration 78 ms Q-T Interval 374 ms QTC Calculation (Bezet) 455 ms Calculated P Axis 44 degrees Calculated R Axis 52 degrees Calculated T Axis 17 degrees Diagnosis Sinus rhythm with Premature atrial complexes with Aberrant conduction Otherwise normal ECG No previous ECGs available Confirmed by LARY CANTU (), Josseline Range ([id number]) on 9/19/2018 11:39:58 AM 
  
GLUCOSE, POC Collection Time: 09/19/18  3:00 PM  
Result Value Ref Range Glucose (POC) 83 65 - 100 mg/dL CBC WITH AUTOMATED DIFF Collection Time: 09/19/18  3:11 PM  
Result Value Ref Range WBC 13.7 (H) 4.3 - 11.1 K/uL  
 RBC 4.91 4.23 - 5.6 M/uL  
 HGB 15.6 13.6 - 17.2 g/dL HCT 45.6 41.1 - 50.3 % MCV 92.9 79.6 - 97.8 FL  
 MCH 31.8 26.1 - 32.9 PG  
 MCHC 34.2 31.4 - 35.0 g/dL  
 RDW 12.8 % PLATELET 377 565 - 986 K/uL MPV 10.3 9.4 - 12.3 FL ABSOLUTE NRBC 0.00 0.0 - 0.2 K/uL  
 DF AUTOMATED NEUTROPHILS 68 43 - 78 % LYMPHOCYTES 23 13 - 44 % MONOCYTES 8 4.0 - 12.0 % EOSINOPHILS 0 (L) 0.5 - 7.8 % BASOPHILS 1 0.0 - 2.0 % IMMATURE GRANULOCYTES 0 0.0 - 5.0 %  
 ABS. NEUTROPHILS 9.3 (H) 1.7 - 8.2 K/UL  
 ABS. LYMPHOCYTES 3.2 0.5 - 4.6 K/UL  
 ABS. MONOCYTES 1.1 0.1 - 1.3 K/UL  
 ABS. EOSINOPHILS 0.0 0.0 - 0.8 K/UL ABS. BASOPHILS 0.1 0.0 - 0.2 K/UL  
 ABS. IMM. GRANS. 0.1 0.0 - 0.5 K/UL METABOLIC PANEL, BASIC Collection Time: 09/19/18  3:11 PM  
Result Value Ref Range Sodium 137 136 - 145 mmol/L Potassium 4.0 3.5 - 5.1 mmol/L Chloride 97 (L) 98 - 107 mmol/L  
 CO2 33 (H) 21 - 32 mmol/L Anion gap 7 7 - 16 mmol/L Glucose 75 65 - 100 mg/dL BUN 17 6 - 23 MG/DL Creatinine 0.85 0.8 - 1.5 MG/DL  
 GFR est AA >60 >60 ml/min/1.73m2 GFR est non-AA >60 >60 ml/min/1.73m2 Calcium 9.4 8.3 - 10.4 MG/DL  
ETHYL ALCOHOL Collection Time: 09/19/18  3:11 PM  
Result Value Ref Range ALCOHOL(ETHYL),SERUM <3 MG/DL  
HEPATIC FUNCTION PANEL Collection Time: 09/19/18  3:11 PM  
Result Value Ref Range Protein, total 8.5 (H) 6.3 - 8.2 g/dL Albumin 4.2 3.5 - 5.0 g/dL Globulin 4.3 (H) 2.3 - 3.5 g/dL A-G Ratio 1.0 (L) 1.2 - 3.5 Bilirubin, total 0.8 0.2 - 1.1 MG/DL Bilirubin, direct 0.2 <0.4 MG/DL Alk. phosphatase 92 50 - 136 U/L  
 AST (SGOT) 67 (H) 15 - 37 U/L  
 ALT (SGPT) 98 (H) 12 - 65 U/L  
AMMONIA Collection Time: 09/19/18  3:11 PM  
Result Value Ref Range Ammonia 16 11 - 32 UMOL/L  
SALICYLATE Collection Time: 09/19/18  3:11 PM  
Result Value Ref Range Salicylate level <0.8 (L) 2.8 - 20.0 MG/DL  
ACETAMINOPHEN Collection Time: 09/19/18  3:11 PM  
Result Value Ref Range Acetaminophen level <2 (L) 10.0 - 30.0 ug/mL GLUCOSE, POC Collection Time: 09/19/18  3:21 PM  
Result Value Ref Range Glucose (POC) 57 (L) 65 - 100 mg/dL GLUCOSE, POC Collection Time: 09/19/18  3:48 PM  
Result Value Ref Range Glucose (POC) 131 (H) 65 - 100 mg/dL All Micro Results Procedure Component Value Units Date/Time CULTURE, BLOOD [[id number]] Order Status:  Sent Specimen:  Blood from Blood CULTURE, BLOOD [[id number]] Order Status:  Sent Specimen:  Blood from Blood Other Studies: 
Ct Head Wo Cont Result Date: 9/19/2018 Noncontrast CT of the brain. COMPARISON: none INDICATION: Delirium TECHNIQUE: Contiguous axial images were obtained from the skull base through the vertex without IV contrast. Radiation dose reduction techniques were used for this study:  Our CT scanners use one or all of the following: Automated exposure control, adjustment of the mA and/or kVp according to patient's size, iterative reconstruction. FINDINGS: There is no acute intracranial hemorrhage or evidence for acute territorial infarction. There is no mass effect, midline shift or hydrocephalus. There is no extra-axial fluid collection. The cerebellum and brainstem are grossly unremarkable. Included orbits and the globes are unremarkable. Postsurgical changes in the maxillary sinuses. Mastoid air cells are aerated. Surrounding bones are intact. IMPRESSION: 1. No evidence of acute intracranial abnormality. No hydrocephalus. Xr Chest [facility] Result Date: 9/19/2018 Portable chest xray  COMPARISON: none. INDICATION: chest pain FINDINGS: No pulmonary consolidation, pleural effusion or pneumothorax. No pulmonary edema. Cardiomediastinal contour is within normal limits. Old healed left-sided rib fractures are noted. IMPRESSION: Negative chest x-ray. Assessment and Plan:  
 
Hospital Problems as of 9/19/2018  Never Reviewed Codes Class Noted - Resolved POA Hypoglycemia ICD-10-CM: E16.2 ICD-9-CM: 251.2  9/19/2018 - Present Unknown Acute encephalopathy ICD-10-CM: G93.40 ICD-9-CM: 348.30  9/19/2018 - Present Yes Elevated liver enzymes ICD-10-CM: R74.8 ICD-9-CM: 790.5  9/19/2018 - Present Yes Type 2 diabetes mellitus (HCC) (Chronic) ICD-10-CM: E11.9 ICD-9-CM: 250.00  9/19/2018 - Present Yes Depression (Chronic) ICD-10-CM: F32.9 ICD-9-CM: 389  9/19/2018 - Present Yes Hypercarbia ICD-10-CM: R06.89 
ICD-9-CM: 786.09  9/19/2018 - Present Yes · Acute metabolic encephalopathy: will give 10 mg IM geodon now due to level of agitation and violent behavior requiring restraints, recent EKG with ok QTC, prn ativan and reassess, will need tele psyche consult once improving, add sitter to bedside, will consult case management for any further needs when stable for discharge, PPD placed, pending UDS/UA, check HIV/hepatitis panel/ serial glucoses/BC x 2, followup CBC and CMP/ too agitated for ABG but may need in future due to hypercarbia · Hypoglycemia with DM2: continue D10 drip for now, frequent glucose checks with hopes to wean dextrose, NPO with current mentation changes, no insulin ordered · Depression: will need tele psyche once improved · Elevated LFTs: check hepatitis and HIV panels Discharge planning:  PPD 
DVT ppx: lovenox Code status:  Full Estimated LOS:  Greater than 2 midnights Risk:  high Care plan: unable to obtain Signed: Gamaliel Muñiz MD

## 2018-09-20 LAB
ALBUMIN SERPL-MCNC: 3.6 G/DL (ref 3.5–5)
ALBUMIN/GLOB SERPL: 0.9 {RATIO} (ref 1.2–3.5)
ALP SERPL-CCNC: 80 U/L (ref 50–136)
ALT SERPL-CCNC: 76 U/L (ref 12–65)
ANION GAP SERPL CALC-SCNC: 6 MMOL/L (ref 7–16)
AST SERPL-CCNC: 45 U/L (ref 15–37)
BASOPHILS # BLD: 0.1 K/UL (ref 0–0.2)
BASOPHILS NFR BLD: 1 % (ref 0–2)
BILIRUB SERPL-MCNC: 1.3 MG/DL (ref 0.2–1.1)
BUN SERPL-MCNC: 16 MG/DL (ref 6–23)
CALCIUM SERPL-MCNC: 8.5 MG/DL (ref 8.3–10.4)
CHLORIDE SERPL-SCNC: 95 MMOL/L (ref 98–107)
CO2 SERPL-SCNC: 29 MMOL/L (ref 21–32)
CREAT SERPL-MCNC: 0.86 MG/DL (ref 0.8–1.5)
DIFFERENTIAL METHOD BLD: ABNORMAL
EOSINOPHIL # BLD: 0 K/UL (ref 0–0.8)
EOSINOPHIL NFR BLD: 0 % (ref 0.5–7.8)
ERYTHROCYTE [DISTWIDTH] IN BLOOD BY AUTOMATED COUNT: 13 %
GLOBULIN SER CALC-MCNC: 4.2 G/DL (ref 2.3–3.5)
GLUCOSE BLD STRIP.AUTO-MCNC: 157 MG/DL (ref 65–100)
GLUCOSE BLD STRIP.AUTO-MCNC: 201 MG/DL (ref 65–100)
GLUCOSE BLD STRIP.AUTO-MCNC: 238 MG/DL (ref 65–100)
GLUCOSE BLD STRIP.AUTO-MCNC: 277 MG/DL (ref 65–100)
GLUCOSE BLD STRIP.AUTO-MCNC: 335 MG/DL (ref 65–100)
GLUCOSE SERPL-MCNC: 318 MG/DL (ref 65–100)
HCT VFR BLD AUTO: 44.1 % (ref 41.1–50.3)
HGB BLD-MCNC: 14.9 G/DL (ref 13.6–17.2)
IMM GRANULOCYTES # BLD: 0 K/UL (ref 0–0.5)
IMM GRANULOCYTES NFR BLD AUTO: 0 % (ref 0–5)
LYMPHOCYTES # BLD: 3.4 K/UL (ref 0.5–4.6)
LYMPHOCYTES NFR BLD: 29 % (ref 13–44)
MCH RBC QN AUTO: 32 PG (ref 26.1–32.9)
MCHC RBC AUTO-ENTMCNC: 33.8 G/DL (ref 31.4–35)
MCV RBC AUTO: 94.6 FL (ref 79.6–97.8)
MONOCYTES # BLD: 1.2 K/UL (ref 0.1–1.3)
MONOCYTES NFR BLD: 11 % (ref 4–12)
NEUTS SEG # BLD: 7.1 K/UL (ref 1.7–8.2)
NEUTS SEG NFR BLD: 60 % (ref 43–78)
NRBC # BLD: 0 K/UL (ref 0–0.2)
PLATELET # BLD AUTO: 177 K/UL (ref 150–450)
PMV BLD AUTO: 10.5 FL (ref 9.4–12.3)
POTASSIUM SERPL-SCNC: 3.7 MMOL/L (ref 3.5–5.1)
PROT SERPL-MCNC: 7.8 G/DL (ref 6.3–8.2)
RBC # BLD AUTO: 4.66 M/UL (ref 4.23–5.6)
SODIUM SERPL-SCNC: 130 MMOL/L (ref 136–145)
WBC # BLD AUTO: 11.8 K/UL (ref 4.3–11.1)

## 2018-09-20 PROCEDURE — 82962 GLUCOSE BLOOD TEST: CPT

## 2018-09-20 PROCEDURE — 74011000302 HC RX REV CODE- 302: Performed by: INTERNAL MEDICINE

## 2018-09-20 PROCEDURE — 86580 TB INTRADERMAL TEST: CPT | Performed by: INTERNAL MEDICINE

## 2018-09-20 PROCEDURE — 36415 COLL VENOUS BLD VENIPUNCTURE: CPT

## 2018-09-20 PROCEDURE — 74011000258 HC RX REV CODE- 258: Performed by: EMERGENCY MEDICINE

## 2018-09-20 PROCEDURE — 85025 COMPLETE CBC W/AUTO DIFF WBC: CPT

## 2018-09-20 PROCEDURE — 74011250636 HC RX REV CODE- 250/636: Performed by: INTERNAL MEDICINE

## 2018-09-20 PROCEDURE — 77030020245 HC SOL INJ 5% D/0.9%NACL

## 2018-09-20 PROCEDURE — 74011636637 HC RX REV CODE- 636/637: Performed by: INTERNAL MEDICINE

## 2018-09-20 PROCEDURE — 74011000258 HC RX REV CODE- 258: Performed by: HOSPITALIST

## 2018-09-20 PROCEDURE — 77030020250 HC SOL INJ D 5% LFCR 1000ML BG LF

## 2018-09-20 PROCEDURE — 77030020263 HC SOL INJ SOD CL0.9% LFCR 1000ML

## 2018-09-20 PROCEDURE — 65270000029 HC RM PRIVATE

## 2018-09-20 PROCEDURE — 80053 COMPREHEN METABOLIC PANEL: CPT

## 2018-09-20 RX ORDER — INSULIN LISPRO 100 [IU]/ML
INJECTION, SOLUTION INTRAVENOUS; SUBCUTANEOUS EVERY 6 HOURS
Status: DISCONTINUED | OUTPATIENT
Start: 2018-09-20 | End: 2018-09-20

## 2018-09-20 RX ORDER — INSULIN LISPRO 100 [IU]/ML
INJECTION, SOLUTION INTRAVENOUS; SUBCUTANEOUS
Status: DISCONTINUED | OUTPATIENT
Start: 2018-09-20 | End: 2018-09-23 | Stop reason: HOSPADM

## 2018-09-20 RX ORDER — DEXTROSE MONOHYDRATE AND SODIUM CHLORIDE 5; .9 G/100ML; G/100ML
100 INJECTION, SOLUTION INTRAVENOUS CONTINUOUS
Status: DISCONTINUED | OUTPATIENT
Start: 2018-09-20 | End: 2018-09-20

## 2018-09-20 RX ORDER — SODIUM CHLORIDE 9 MG/ML
100 INJECTION, SOLUTION INTRAVENOUS CONTINUOUS
Status: DISCONTINUED | OUTPATIENT
Start: 2018-09-20 | End: 2018-09-22

## 2018-09-20 RX ADMIN — LORAZEPAM 1 MG: 2 INJECTION INTRAMUSCULAR; INTRAVENOUS at 01:18

## 2018-09-20 RX ADMIN — DEXTROSE MONOHYDRATE 150 ML/HR: 10 INJECTION, SOLUTION INTRAVENOUS at 01:07

## 2018-09-20 RX ADMIN — INSULIN LISPRO 2 UNITS: 100 INJECTION, SOLUTION INTRAVENOUS; SUBCUTANEOUS at 22:43

## 2018-09-20 RX ADMIN — TUBERCULIN PURIFIED PROTEIN DERIVATIVE 5 UNITS: 5 INJECTION, SOLUTION INTRADERMAL at 01:06

## 2018-09-20 RX ADMIN — Medication 10 ML: at 22:15

## 2018-09-20 RX ADMIN — SODIUM CHLORIDE 100 ML/HR: 900 INJECTION, SOLUTION INTRAVENOUS at 19:48

## 2018-09-20 RX ADMIN — LORAZEPAM 1 MG: 2 INJECTION INTRAMUSCULAR; INTRAVENOUS at 13:06

## 2018-09-20 RX ADMIN — Medication 10 ML: at 17:38

## 2018-09-20 RX ADMIN — ENOXAPARIN SODIUM 40 MG: 40 INJECTION, SOLUTION INTRAVENOUS; SUBCUTANEOUS at 22:43

## 2018-09-20 RX ADMIN — LORAZEPAM 1 MG: 2 INJECTION INTRAMUSCULAR; INTRAVENOUS at 17:31

## 2018-09-20 RX ADMIN — SODIUM CHLORIDE 100 ML/HR: 900 INJECTION, SOLUTION INTRAVENOUS at 09:07

## 2018-09-20 RX ADMIN — LORAZEPAM 1 MG: 2 INJECTION INTRAMUSCULAR; INTRAVENOUS at 09:39

## 2018-09-20 RX ADMIN — LORAZEPAM 1 MG: 2 INJECTION INTRAMUSCULAR; INTRAVENOUS at 22:14

## 2018-09-20 RX ADMIN — Medication 10 ML: at 14:00

## 2018-09-20 RX ADMIN — INSULIN LISPRO 4 UNITS: 100 INJECTION, SOLUTION INTRAVENOUS; SUBCUTANEOUS at 09:10

## 2018-09-20 RX ADMIN — DEXTROSE MONOHYDRATE AND SODIUM CHLORIDE 100 ML/HR: 5; .9 INJECTION, SOLUTION INTRAVENOUS at 05:53

## 2018-09-20 RX ADMIN — ENOXAPARIN SODIUM 40 MG: 40 INJECTION, SOLUTION INTRAVENOUS; SUBCUTANEOUS at 00:11

## 2018-09-20 RX ADMIN — INSULIN LISPRO 4 UNITS: 100 INJECTION, SOLUTION INTRAVENOUS; SUBCUTANEOUS at 16:30

## 2018-09-20 RX ADMIN — Medication 10 ML: at 22:14

## 2018-09-20 RX ADMIN — HALOPERIDOL LACTATE 5 MG: 5 INJECTION, SOLUTION INTRAMUSCULAR at 10:54

## 2018-09-20 NOTE — PROGRESS NOTES
Pt has remained agitated on occasions this shift. Rested with eyes closed at intervals. Steffen. sw restraints intact per md orders, sitter at bedside. ivf infusing without diff.  md notified of fbs 336 ordered to change ivf to d5ns @100ml/hr. Ness catheter intact draining irma colored urine without diff. Pt remains confused. sr up x2, bed lowered and locked, and call light within reach.

## 2018-09-20 NOTE — PROGRESS NOTES
SPEECH PATHOLOGY NOTE: 
Consult received and chart reviewed. RN reports patient is agitated and confused and not appropriate for assessment currently. Will attempt at a later time as schedule permits and patient available and appropriate for intervention.  
Tha Antonio MA, CCC-SLP

## 2018-09-20 NOTE — PROGRESS NOTES
Spoke to Mr. Yanira Short in room 215. One on one sitter present. Mr. Yanira Short is oriented to name only, and believes he is in [place], [place] or in [place], [place]. He says he is not sure why he is at [facility], or why he is in [place], [place]. Spoke to Marc at [facility] for [facility] in [place], [place] at [phone]. Mr. Yanira Short was there under involuntary commitment for suicidal ideation. He was transferred from [address] [place] [address] to [facility] yesterday due to increased confusion, increased agitation, and is diagnosed with metabolic encephalopathy here at [address] [facility]. Commitment papers renewed (valid for up to 72 hours) and original in patient's paper chart. Plan is back to [address] [place] [address] when medically stable. Care Management Interventions Plan discussed with Pt/Family/Caregiver:  Yes

## 2018-09-20 NOTE — PROGRESS NOTES
Order received, chart reviewed. Per chart and RN report, pt is not appropriate for PT evaluation today d/t confusion and agitation. Will follow up tomorrow or as pt's status allows.  
 
Mita Mattson, PT, DPT

## 2018-09-20 NOTE — PROGRESS NOTES
Order received, chart reviewed. Per chart and RN report, pt is not appropriate for OT session today d/t confusion and agitation. Will follow up tomorrow or as pt's status allows.  
 
Dion Christy, OT

## 2018-09-20 NOTE — PROGRESS NOTES
Patient agitated, keeping eyes close and screaming \"help\". Accurately  stated full name and birthday. Word salad and talking to himself. Bilateral wrist restraints on. Sitter at bedside. Medicated with ativan one hr ago with no relief noted, haldol PRN given.

## 2018-09-20 NOTE — PROGRESS NOTES
Hospitalist Progress Note Admit Date:  2018  2:56 PM  
Name:  Nathalie Werner Age:  43 y.o. 
:  1975 MRN:  [id number] PCP:  Not On File [facility] Treatment Team: Attending Provider: Nikita Maddox MD; Consulting Provider: Nikita Maddox MD; Care Manager: Charlotte Lim, RN; Speech Language Pathologist: Nellie Morrison, SLP Subjective:  
 
 
Mr. Yanira Short is a 42 yo male with PMH of anxiety/depression admitted to [facility] for suicidal ideation and xanax detox who is evaluated for the 2nd time in the ED with worsening confusion. He has apparently been weaned off xanax and then developed confusion with first ED workup being unrevealing with negative CT head, labs. He returned to [facility] and developed worsening confusion with hypoglycemia in the setting of DM2, he has been receiving insulin, prn ativan and haldol while at [facility]. He was  in 4 point restraints in the ED, agitated and kicking and yelling. Glucose has improved with D50 and on D10 IVF. He received IV thiamine prior to dextrose. UDS and  ETOH negative, asa/tylenol negative. He has mild leukocytosis, CXR negative, pending UA, LFTS elevated- HIV negative and HEP panel pending. EKG from   with normal QTC. He has slightly improved regarding overall agitation but remains confused, sitter present. He still has required restraints and haldol/ativan prn. Tele psyche consulted for medication assistance. Case management consulted. 18 glucoses are improved and dextrose improving, patient alert to year and self but otherwise confused with hallucinations, sitter present,  
 
 
Objective:  
Patient Vitals for the past 24 hrs: 
 Temp Pulse Resp BP SpO2  
18 0750 98.6 °F (37 °C) 97 20 130/70 94 % 18 0432 97.5 °F (36.4 °C) 86 20 120/83 93 % 18 0000 99 °F (37.2 °C) 100 20 124/78 91 % 18 2025 98.4 °F (36.9 °C) 99 22 131/82 95 % 18 1725 - (!) 106 - 153/60 91 % 09/19/18 1502 98.9 °F (37.2 °C) 99 18 118/88 94 % Oxygen Therapy O2 Sat (%): 94 % (09/20/18 0750) Pulse via Oximetry: 106 beats per minute (09/19/18 1725) O2 Device: Room air (09/19/18 1502) Intake/Output Summary (Last 24 hours) at 09/20/18 5229 Last data filed at 09/20/18 7711 Gross per 24 hour Intake              278 ml Output              800 ml Net             -522 ml General:    Well nourished. Alert. But agitated, confused to circumstances and having active hallucinations CV:   RRR. No murmur, rub, or gallop. No edema Lungs:   CTAB. No wheezing, rhonchi, or rales. Anterior exam  
Abdomen:   Soft, nontender, nondistended. obese Extremities: Warm and dry. Skin:     No rashes or jaundice. Neuro:  No gross focal deficits Psyche:  Alert to self and year Data Review: 
I have reviewed all labs, meds, telemetry events, and studies from the last 24 hours: 
 
Recent Results (from the past 24 hour(s)) GLUCOSE, POC Collection Time: 09/19/18  3:00 PM  
Result Value Ref Range Glucose (POC) 83 65 - 100 mg/dL CBC WITH AUTOMATED DIFF Collection Time: 09/19/18  3:11 PM  
Result Value Ref Range WBC 13.7 (H) 4.3 - 11.1 K/uL  
 RBC 4.91 4.23 - 5.6 M/uL  
 HGB 15.6 13.6 - 17.2 g/dL HCT 45.6 41.1 - 50.3 % MCV 92.9 79.6 - 97.8 FL  
 MCH 31.8 26.1 - 32.9 PG  
 MCHC 34.2 31.4 - 35.0 g/dL  
 RDW 12.8 % PLATELET 413 069 - 512 K/uL MPV 10.3 9.4 - 12.3 FL ABSOLUTE NRBC 0.00 0.0 - 0.2 K/uL  
 DF AUTOMATED NEUTROPHILS 68 43 - 78 % LYMPHOCYTES 23 13 - 44 % MONOCYTES 8 4.0 - 12.0 % EOSINOPHILS 0 (L) 0.5 - 7.8 % BASOPHILS 1 0.0 - 2.0 % IMMATURE GRANULOCYTES 0 0.0 - 5.0 %  
 ABS. NEUTROPHILS 9.3 (H) 1.7 - 8.2 K/UL  
 ABS. LYMPHOCYTES 3.2 0.5 - 4.6 K/UL  
 ABS. MONOCYTES 1.1 0.1 - 1.3 K/UL  
 ABS. EOSINOPHILS 0.0 0.0 - 0.8 K/UL  
 ABS. BASOPHILS 0.1 0.0 - 0.2 K/UL  
 ABS. IMM. GRANS. 0.1 0.0 - 0.5 K/UL METABOLIC PANEL, BASIC  
 Collection Time: 09/19/18  3:11 PM  
Result Value Ref Range Sodium 137 136 - 145 mmol/L Potassium 4.0 3.5 - 5.1 mmol/L Chloride 97 (L) 98 - 107 mmol/L  
 CO2 33 (H) 21 - 32 mmol/L Anion gap 7 7 - 16 mmol/L Glucose 75 65 - 100 mg/dL BUN 17 6 - 23 MG/DL Creatinine 0.85 0.8 - 1.5 MG/DL  
 GFR est AA >60 >60 ml/min/1.73m2 GFR est non-AA >60 >60 ml/min/1.73m2 Calcium 9.4 8.3 - 10.4 MG/DL  
ETHYL ALCOHOL Collection Time: 09/19/18  3:11 PM  
Result Value Ref Range ALCOHOL(ETHYL),SERUM <3 MG/DL  
HEPATIC FUNCTION PANEL Collection Time: 09/19/18  3:11 PM  
Result Value Ref Range Protein, total 8.5 (H) 6.3 - 8.2 g/dL Albumin 4.2 3.5 - 5.0 g/dL Globulin 4.3 (H) 2.3 - 3.5 g/dL A-G Ratio 1.0 (L) 1.2 - 3.5 Bilirubin, total 0.8 0.2 - 1.1 MG/DL Bilirubin, direct 0.2 <0.4 MG/DL Alk. phosphatase 92 50 - 136 U/L  
 AST (SGOT) 67 (H) 15 - 37 U/L  
 ALT (SGPT) 98 (H) 12 - 65 U/L  
AMMONIA Collection Time: 09/19/18  3:11 PM  
Result Value Ref Range Ammonia 16 11 - 32 UMOL/L  
SALICYLATE Collection Time: 09/19/18  3:11 PM  
Result Value Ref Range Salicylate level <8.4 (L) 2.8 - 20.0 MG/DL  
ACETAMINOPHEN Collection Time: 09/19/18  3:11 PM  
Result Value Ref Range Acetaminophen level <2 (L) 10.0 - 30.0 ug/mL GLUCOSE, POC Collection Time: 09/19/18  3:21 PM  
Result Value Ref Range Glucose (POC) 57 (L) 65 - 100 mg/dL GLUCOSE, POC Collection Time: 09/19/18  3:48 PM  
Result Value Ref Range Glucose (POC) 131 (H) 65 - 100 mg/dL DRUG SCREEN, URINE Collection Time: 09/19/18  4:23 PM  
Result Value Ref Range PCP(PHENCYCLIDINE) NEGATIVE BENZODIAZEPINES NEGATIVE     
 COCAINE NEGATIVE AMPHETAMINES NEGATIVE METHADONE NEGATIVE     
 THC (TH-CANNABINOL) NEGATIVE     
 OPIATES NEGATIVE     
 BARBITURATES NEGATIVE     
GLUCOSE, POC Collection Time: 09/19/18  6:40 PM  
Result Value Ref Range Glucose (POC) 141 (H) 65 - 100 mg/dL GLUCOSE, POC Collection Time: 09/19/18  9:08 PM  
Result Value Ref Range Glucose (POC) 154 (H) 65 - 100 mg/dL HIV-1,2 P24 AG/AB SCREEN Collection Time: 09/19/18  9:20 PM  
Result Value Ref Range p24 Antigen NONREACTIVE NR    
 HIV-1,2 Ab NONREACTIVE NR    
FOLATE Collection Time: 09/19/18  9:20 PM  
Result Value Ref Range Folate 18.6 (H) 3.1 - 17.5 ng/mL VITAMIN B12 Collection Time: 09/19/18  9:20 PM  
Result Value Ref Range Vitamin B12 494 193 - 986 pg/mL TSH 3RD GENERATION Collection Time: 09/19/18  9:20 PM  
Result Value Ref Range TSH 2.160 0.358 - 3.740 uIU/mL CULTURE, BLOOD Collection Time: 09/19/18  9:20 PM  
Result Value Ref Range Special Requests: RIGHT HAND Culture result: PENDING   
PROTHROMBIN TIME + INR Collection Time: 09/19/18  9:20 PM  
Result Value Ref Range Prothrombin time 13.3 11.5 - 14.5 sec INR 1.1 GLUCOSE, POC Collection Time: 09/20/18  1:10 AM  
Result Value Ref Range Glucose (POC) 277 (H) 65 - 100 mg/dL METABOLIC PANEL, COMPREHENSIVE Collection Time: 09/20/18  4:14 AM  
Result Value Ref Range Sodium 130 (L) 136 - 145 mmol/L Potassium 3.7 3.5 - 5.1 mmol/L Chloride 95 (L) 98 - 107 mmol/L  
 CO2 29 21 - 32 mmol/L Anion gap 6 (L) 7 - 16 mmol/L Glucose 318 (H) 65 - 100 mg/dL BUN 16 6 - 23 MG/DL Creatinine 0.86 0.8 - 1.5 MG/DL  
 GFR est AA >60 >60 ml/min/1.73m2 GFR est non-AA >60 >60 ml/min/1.73m2 Calcium 8.5 8.3 - 10.4 MG/DL Bilirubin, total 1.3 (H) 0.2 - 1.1 MG/DL  
 ALT (SGPT) 76 (H) 12 - 65 U/L  
 AST (SGOT) 45 (H) 15 - 37 U/L Alk. phosphatase 80 50 - 136 U/L Protein, total 7.8 6.3 - 8.2 g/dL Albumin 3.6 3.5 - 5.0 g/dL Globulin 4.2 (H) 2.3 - 3.5 g/dL A-G Ratio 0.9 (L) 1.2 - 3.5    
CBC WITH AUTOMATED DIFF Collection Time: 09/20/18  4:14 AM  
Result Value Ref Range WBC 11.8 (H) 4.3 - 11.1 K/uL  
 RBC 4.66 4.23 - 5.6 M/uL  
 HGB 14.9 13.6 - 17.2 g/dL HCT 44.1 41.1 - 50.3 % MCV 94.6 79.6 - 97.8 FL  
 MCH 32.0 26.1 - 32.9 PG  
 MCHC 33.8 31.4 - 35.0 g/dL  
 RDW 13.0 % PLATELET 387 076 - 214 K/uL MPV 10.5 9.4 - 12.3 FL ABSOLUTE NRBC 0.00 0.0 - 0.2 K/uL  
 DF AUTOMATED NEUTROPHILS 60 43 - 78 % LYMPHOCYTES 29 13 - 44 % MONOCYTES 11 4.0 - 12.0 % EOSINOPHILS 0 (L) 0.5 - 7.8 % BASOPHILS 1 0.0 - 2.0 % IMMATURE GRANULOCYTES 0 0.0 - 5.0 %  
 ABS. NEUTROPHILS 7.1 1.7 - 8.2 K/UL  
 ABS. LYMPHOCYTES 3.4 0.5 - 4.6 K/UL  
 ABS. MONOCYTES 1.2 0.1 - 1.3 K/UL  
 ABS. EOSINOPHILS 0.0 0.0 - 0.8 K/UL  
 ABS. BASOPHILS 0.1 0.0 - 0.2 K/UL  
 ABS. IMM. GRANS. 0.0 0.0 - 0.5 K/UL GLUCOSE, POC Collection Time: 09/20/18  4:53 AM  
Result Value Ref Range Glucose (POC) 335 (H) 65 - 100 mg/dL All Micro Results Procedure Component Value Units Date/Time CULTURE, BLOOD [[id number]] Collected:  09/19/18 2120 Order Status:  Completed Specimen:  Blood from Blood Updated:  09/19/18 2216 Special Requests: RIGHT HAND Culture result: PENDING  
 CULTURE, BLOOD [[id number]] Order Status:  Sent Specimen:  Blood from Blood No results found for this visit on 09/19/18. Current Meds: 
Current Facility-Administered Medications Medication Dose Route Frequency  0.9% sodium chloride infusion  100 mL/hr IntraVENous CONTINUOUS  
 insulin lispro (HUMALOG) injection   SubCUTAneous Q6H  
 sodium chloride (NS) flush 5-10 mL  5-10 mL IntraVENous Q8H  
 sodium chloride (NS) flush 5-10 mL  5-10 mL IntraVENous PRN  
 sodium chloride (NS) flush 5-10 mL  5-10 mL IntraVENous Q8H  
 sodium chloride (NS) flush 5-10 mL  5-10 mL IntraVENous PRN  
 ondansetron (ZOFRAN) injection 4 mg  4 mg IntraVENous Q4H PRN  
 enoxaparin (LOVENOX) injection 40 mg  40 mg SubCUTAneous Q24H  
 tuberculin injection 5 Units  5 Units IntraDERMal ONCE  
 LORazepam (ATIVAN) injection 1 mg  1 mg IntraVENous Q4H PRN  
  haloperidol lactate (HALDOL) injection 5 mg  5 mg IntraMUSCular Q8H PRN Other Studies (last 24 hours): No results found. Assessment and Plan:  
 
Hospital Problems as of 9/20/2018  Never Reviewed Codes Class Noted - Resolved POA Hypoglycemia ICD-10-CM: E16.2 ICD-9-CM: 251.2  9/19/2018 - Present Unknown Acute encephalopathy ICD-10-CM: G93.40 ICD-9-CM: 348.30  9/19/2018 - Present Yes Elevated liver enzymes ICD-10-CM: R74.8 ICD-9-CM: 790.5  9/19/2018 - Present Yes Type 2 diabetes mellitus (HCC) (Chronic) ICD-10-CM: E11.9 ICD-9-CM: 250.00  9/19/2018 - Present Yes Depression (Chronic) ICD-10-CM: F32.9 ICD-9-CM: 023  9/19/2018 - Present Yes Hypercarbia ICD-10-CM: R06.89 
ICD-9-CM: 786.09  9/19/2018 - Present Yes Plan: · Acute metabolic encephalopathy: pending tele psyche consult and continue sitter to bedside,  consulted case management for any further needs when stable for discharge, PPD placed, pending UA and hepatitis panel/ serial glucoses have improved /BC x 2 penidng, followup CBC and CMP · Hypoglycemia with DM2: stop dextrose drip, add SSI, start diet if mentally alert · Depression: no scheduled medications, will await psyche recommendations · Elevated LFTs: followup trends and  Hepatitis panel · Hyponatremia: due to hyperglycemia and will followup BMP 
  
Discharge planning:  PPD 
DVT ppx: lovenox Code status:  Full Estimated LOS:  Greater than 2 midnights Risk:  high Care plan: unable to obtain DC planning/Dispo:   
Diet:  DIET DIABETIC CONSISTENT CARB 
DVT ppx:   
 
Signed: Sachi Queen MD

## 2018-09-21 LAB
ALBUMIN SERPL-MCNC: 3.5 G/DL (ref 3.5–5)
ALBUMIN/GLOB SERPL: 0.8 {RATIO} (ref 1.2–3.5)
ALP SERPL-CCNC: 78 U/L (ref 50–136)
ALT SERPL-CCNC: 65 U/L (ref 12–65)
ANION GAP SERPL CALC-SCNC: 10 MMOL/L (ref 7–16)
APPEARANCE UR: CLEAR
APPEARANCE UR: CLEAR
AST SERPL-CCNC: 42 U/L (ref 15–37)
BACTERIA URNS QL MICRO: 0 /HPF
BACTERIA URNS QL MICRO: 0 /HPF
BASOPHILS # BLD: 0 K/UL (ref 0–0.2)
BASOPHILS NFR BLD: 0 % (ref 0–2)
BILIRUB SERPL-MCNC: 1 MG/DL (ref 0.2–1.1)
BILIRUB UR QL: NEGATIVE
BILIRUB UR QL: NEGATIVE
BUN SERPL-MCNC: 11 MG/DL (ref 6–23)
CALCIUM SERPL-MCNC: 8.3 MG/DL (ref 8.3–10.4)
CASTS URNS QL MICRO: 0 /LPF
CASTS URNS QL MICRO: 0 /LPF
CHLORIDE SERPL-SCNC: 102 MMOL/L (ref 98–107)
CO2 SERPL-SCNC: 25 MMOL/L (ref 21–32)
COLOR UR: YELLOW
COLOR UR: YELLOW
CREAT SERPL-MCNC: 0.6 MG/DL (ref 0.8–1.5)
DIFFERENTIAL METHOD BLD: ABNORMAL
EOSINOPHIL # BLD: 0 K/UL (ref 0–0.8)
EOSINOPHIL NFR BLD: 0 % (ref 0.5–7.8)
EPI CELLS #/AREA URNS HPF: 0 /HPF
EPI CELLS #/AREA URNS HPF: 0 /HPF
ERYTHROCYTE [DISTWIDTH] IN BLOOD BY AUTOMATED COUNT: 12.9 %
GLOBULIN SER CALC-MCNC: 4.2 G/DL (ref 2.3–3.5)
GLUCOSE BLD STRIP.AUTO-MCNC: 131 MG/DL (ref 65–100)
GLUCOSE BLD STRIP.AUTO-MCNC: 184 MG/DL (ref 65–100)
GLUCOSE BLD STRIP.AUTO-MCNC: 255 MG/DL (ref 65–100)
GLUCOSE BLD STRIP.AUTO-MCNC: 256 MG/DL (ref 65–100)
GLUCOSE BLD STRIP.AUTO-MCNC: 272 MG/DL (ref 65–100)
GLUCOSE SERPL-MCNC: 118 MG/DL (ref 65–100)
GLUCOSE UR STRIP.AUTO-MCNC: >1000 MG/DL
GLUCOSE UR STRIP.AUTO-MCNC: >1000 MG/DL
HAV IGM SERPL QL IA: NEGATIVE
HBV CORE IGM SERPL QL IA: NEGATIVE
HBV SURFACE AG SERPL QL IA: NEGATIVE
HCT VFR BLD AUTO: 43.9 % (ref 41.1–50.3)
HCV AB S/CO SERPL IA: >11 S/CO RATIO (ref 0–0.9)
HGB BLD-MCNC: 14.7 G/DL (ref 13.6–17.2)
HGB UR QL STRIP: ABNORMAL
HGB UR QL STRIP: ABNORMAL
IMM GRANULOCYTES # BLD: 0 K/UL (ref 0–0.5)
IMM GRANULOCYTES NFR BLD AUTO: 0 % (ref 0–5)
KETONES UR QL STRIP.AUTO: 15 MG/DL
KETONES UR QL STRIP.AUTO: >80 MG/DL
LEUKOCYTE ESTERASE UR QL STRIP.AUTO: ABNORMAL
LEUKOCYTE ESTERASE UR QL STRIP.AUTO: ABNORMAL
LYMPHOCYTES # BLD: 2.6 K/UL (ref 0.5–4.6)
LYMPHOCYTES NFR BLD: 26 % (ref 13–44)
MCH RBC QN AUTO: 32 PG (ref 26.1–32.9)
MCHC RBC AUTO-ENTMCNC: 33.5 G/DL (ref 31.4–35)
MCV RBC AUTO: 95.6 FL (ref 79.6–97.8)
MM INDURATION POC: 0 MM (ref 0–5)
MONOCYTES # BLD: 0.8 K/UL (ref 0.1–1.3)
MONOCYTES NFR BLD: 8 % (ref 4–12)
NEUTS SEG # BLD: 6.6 K/UL (ref 1.7–8.2)
NEUTS SEG NFR BLD: 65 % (ref 43–78)
NITRITE UR QL STRIP.AUTO: NEGATIVE
NITRITE UR QL STRIP.AUTO: NEGATIVE
NRBC # BLD: 0 K/UL (ref 0–0.2)
PH UR STRIP: 5.5 [PH] (ref 5–9)
PH UR STRIP: 6 [PH] (ref 5–9)
PLATELET # BLD AUTO: 165 K/UL (ref 150–450)
PMV BLD AUTO: 10.6 FL (ref 9.4–12.3)
POTASSIUM SERPL-SCNC: 3.6 MMOL/L (ref 3.5–5.1)
PPD POC: NORMAL NEGATIVE
PROT SERPL-MCNC: 7.7 G/DL (ref 6.3–8.2)
PROT UR STRIP-MCNC: NEGATIVE MG/DL
PROT UR STRIP-MCNC: NEGATIVE MG/DL
RBC # BLD AUTO: 4.59 M/UL (ref 4.23–5.6)
RBC #/AREA URNS HPF: ABNORMAL /HPF
RBC #/AREA URNS HPF: ABNORMAL /HPF
SODIUM SERPL-SCNC: 137 MMOL/L (ref 136–145)
SP GR UR REFRACTOMETRY: 1.02 (ref 1–1.02)
SP GR UR REFRACTOMETRY: 1.02 (ref 1–1.02)
UROBILINOGEN UR QL STRIP.AUTO: 1 EU/DL (ref 0.2–1)
UROBILINOGEN UR QL STRIP.AUTO: 1 EU/DL (ref 0.2–1)
WBC # BLD AUTO: 10 K/UL (ref 4.3–11.1)
WBC URNS QL MICRO: ABNORMAL /HPF
WBC URNS QL MICRO: ABNORMAL /HPF

## 2018-09-21 PROCEDURE — 80053 COMPREHEN METABOLIC PANEL: CPT

## 2018-09-21 PROCEDURE — 97165 OT EVAL LOW COMPLEX 30 MIN: CPT

## 2018-09-21 PROCEDURE — 97161 PT EVAL LOW COMPLEX 20 MIN: CPT

## 2018-09-21 PROCEDURE — 74011000258 HC RX REV CODE- 258: Performed by: INTERNAL MEDICINE

## 2018-09-21 PROCEDURE — 90471 IMMUNIZATION ADMIN: CPT

## 2018-09-21 PROCEDURE — 82962 GLUCOSE BLOOD TEST: CPT

## 2018-09-21 PROCEDURE — 74011250636 HC RX REV CODE- 250/636: Performed by: INTERNAL MEDICINE

## 2018-09-21 PROCEDURE — 90686 IIV4 VACC NO PRSV 0.5 ML IM: CPT | Performed by: INTERNAL MEDICINE

## 2018-09-21 PROCEDURE — 74011250637 HC RX REV CODE- 250/637: Performed by: INTERNAL MEDICINE

## 2018-09-21 PROCEDURE — 81001 URINALYSIS AUTO W/SCOPE: CPT

## 2018-09-21 PROCEDURE — 85025 COMPLETE CBC W/AUTO DIFF WBC: CPT

## 2018-09-21 PROCEDURE — 92610 EVALUATE SWALLOWING FUNCTION: CPT

## 2018-09-21 PROCEDURE — 74011636637 HC RX REV CODE- 636/637: Performed by: INTERNAL MEDICINE

## 2018-09-21 PROCEDURE — 36415 COLL VENOUS BLD VENIPUNCTURE: CPT

## 2018-09-21 PROCEDURE — 65270000029 HC RM PRIVATE

## 2018-09-21 PROCEDURE — 87086 URINE CULTURE/COLONY COUNT: CPT

## 2018-09-21 PROCEDURE — 77030020263 HC SOL INJ SOD CL0.9% LFCR 1000ML

## 2018-09-21 RX ORDER — INSULIN GLARGINE 100 [IU]/ML
10 INJECTION, SOLUTION SUBCUTANEOUS
Status: DISCONTINUED | OUTPATIENT
Start: 2018-09-21 | End: 2018-09-23 | Stop reason: HOSPADM

## 2018-09-21 RX ORDER — LORAZEPAM 1 MG/1
1 TABLET ORAL 2 TIMES DAILY
Status: DISCONTINUED | OUTPATIENT
Start: 2018-09-21 | End: 2018-09-22

## 2018-09-21 RX ORDER — ACETAMINOPHEN 500 MG
500 TABLET ORAL
Status: DISCONTINUED | OUTPATIENT
Start: 2018-09-21 | End: 2018-09-23 | Stop reason: HOSPADM

## 2018-09-21 RX ORDER — LORAZEPAM 1 MG/1
1 TABLET ORAL 2 TIMES DAILY
Status: DISCONTINUED | OUTPATIENT
Start: 2018-09-21 | End: 2018-09-21

## 2018-09-21 RX ADMIN — LORAZEPAM 1 MG: 2 INJECTION INTRAMUSCULAR; INTRAVENOUS at 15:06

## 2018-09-21 RX ADMIN — INSULIN LISPRO 6 UNITS: 100 INJECTION, SOLUTION INTRAVENOUS; SUBCUTANEOUS at 11:04

## 2018-09-21 RX ADMIN — HALOPERIDOL LACTATE 5 MG: 5 INJECTION, SOLUTION INTRAMUSCULAR at 01:24

## 2018-09-21 RX ADMIN — LORAZEPAM 1 MG: 1 TABLET ORAL at 19:54

## 2018-09-21 RX ADMIN — SODIUM CHLORIDE 100 ML/HR: 900 INJECTION, SOLUTION INTRAVENOUS at 15:07

## 2018-09-21 RX ADMIN — LORAZEPAM 1 MG: 1 TABLET ORAL at 13:14

## 2018-09-21 RX ADMIN — INSULIN GLARGINE 10 UNITS: 100 INJECTION, SOLUTION SUBCUTANEOUS at 22:15

## 2018-09-21 RX ADMIN — LORAZEPAM 1 MG: 2 INJECTION INTRAMUSCULAR; INTRAVENOUS at 03:32

## 2018-09-21 RX ADMIN — CEFTRIAXONE SODIUM 1 G: 1 INJECTION, POWDER, FOR SOLUTION INTRAMUSCULAR; INTRAVENOUS at 16:37

## 2018-09-21 RX ADMIN — Medication 10 ML: at 22:19

## 2018-09-21 RX ADMIN — ACETAMINOPHEN 500 MG: 500 TABLET, FILM COATED ORAL at 16:43

## 2018-09-21 RX ADMIN — ENOXAPARIN SODIUM 40 MG: 40 INJECTION, SOLUTION INTRAVENOUS; SUBCUTANEOUS at 22:42

## 2018-09-21 RX ADMIN — Medication 10 ML: at 13:15

## 2018-09-21 RX ADMIN — INSULIN LISPRO 2 UNITS: 100 INJECTION, SOLUTION INTRAVENOUS; SUBCUTANEOUS at 22:15

## 2018-09-21 RX ADMIN — ACETAMINOPHEN 500 MG: 500 TABLET, FILM COATED ORAL at 22:41

## 2018-09-21 RX ADMIN — SODIUM CHLORIDE 100 ML/HR: 900 INJECTION, SOLUTION INTRAVENOUS at 05:32

## 2018-09-21 RX ADMIN — INSULIN LISPRO 6 UNITS: 100 INJECTION, SOLUTION INTRAVENOUS; SUBCUTANEOUS at 16:42

## 2018-09-21 RX ADMIN — INFLUENZA VIRUS VACCINE 0.5 ML: 15; 15; 15; 15 SUSPENSION INTRAMUSCULAR at 11:38

## 2018-09-21 NOTE — PROGRESS NOTES
Dispo update:  Dr. Skinny Zaidi spoke to [facility] about Mr. Keller Reason in room 215. Plan is transfer to [facility] tomorrow. Notified James Phillips [phone] to cancel transport for today, and put Mr. Keller Reason on \"will call\" for transport tomorrow. Transfer packet on 2nd surgical desk, just needs discharge summary added when it is completed.

## 2018-09-21 NOTE — PROGRESS NOTES
Hospitalist Progress Note Admit Date:  2018  2:56 PM  
Name:  Anayeli Shah Age:  43 y.o. 
:  1975 MRN:  [id number] PCP:  Not On File Bsi Treatment Team: Attending Provider: Demetria Lennox, MD; Consulting Provider: Demetria Lennox, MD; Care Manager: Eden Soulier, RN; Utilization Review: Spencer Garcia; Speech Language Pathologist: AMAN Garcia Subjective:  
 
 
Mr. Gina Colin is a 42 yo male with PMH of anxiety/depression admitted to [facility] for suicidal ideation and xanax detox who is evaluated for the 2nd time in the ED with worsening confusion. He has apparently been weaned off xanax and then developed confusion with first ED workup being unrevealing with negative CT head, labs. He returned to [facility] and developed worsening confusion with hypoglycemia in the setting of DM2, he has been receiving insulin, prn ativan and haldol while at [facility]. He was  in 4 point restraints in the ED, agitated and kicking and yelling. Glucose has improved with D50 and on D10 IVF. He received IV thiamine prior to dextrose. UDS and  ETOH negative, asa/tylenol negative. He had mild leukocytosis, CXR negative, pending UA, LFTS elevated- HIV negative and HEP panel pending. EKG from   with normal QTC. He has improved regarding overall agitation but remains intermittently confused, sitter present. He still has required restraints and haldol/ativan prn. Tele psyche consulted for medication assistance and agrees with haldol/ativan. Case management consulted. He will need to return to [facility] at discharge. 18 glucoses are improved , alert to self and place, denies suicidal ideation , eating breakfast, wants to go home , asking for medications for pain and anxiety Objective:  
 
Patient Vitals for the past 24 hrs: 
 Temp Pulse Resp BP SpO2  
18 0732 98.3 °F (36.8 °C) 100 18 138/85 95 % 18 0323 98.9 °F (37.2 °C) 97 18 142/82 94 % 09/20/18 2336 98.5 °F (36.9 °C) (!) 101 18 128/75 93 % 09/20/18 2101 99.3 °F (37.4 °C) (!) 103 22 132/80 94 % 09/20/18 1535 98.8 °F (37.1 °C) (!) 105 18 139/86 94 % 09/20/18 1125 98.4 °F (36.9 °C) 88 17 131/79 96 % Oxygen Therapy O2 Sat (%): 95 % (09/21/18 0732) Pulse via Oximetry: 106 beats per minute (09/19/18 1725) O2 Device: Room air (09/19/18 1502) Intake/Output Summary (Last 24 hours) at 09/21/18 0825 Last data filed at 09/21/18 6948 Gross per 24 hour Intake             2999 ml Output             1600 ml Net             1399 ml General:    Well nourished. Alert. Calm and verbal 
CV:   RRR. No murmur, rub, or gallop. No edema Lungs:   CTAB. No wheezing, rhonchi, or rales. Anterior exam  
Abdomen:   Soft, nontender, nondistended. obese Extremities: Warm and dry. Skin:     No rashes or jaundice. Neuro:  No gross focal deficits Psyche:  Alert to self and place Data Review: 
I have reviewed all labs, meds, telemetry events, and studies from the last 24 hours: 
 
Recent Results (from the past 24 hour(s)) GLUCOSE, POC Collection Time: 09/20/18  9:05 AM  
Result Value Ref Range Glucose (POC) 238 (H) 65 - 100 mg/dL GLUCOSE, POC Collection Time: 09/20/18  5:27 PM  
Result Value Ref Range Glucose (POC) 201 (H) 65 - 100 mg/dL GLUCOSE, POC Collection Time: 09/20/18 10:34 PM  
Result Value Ref Range Glucose (POC) 157 (H) 65 - 100 mg/dL PLEASE READ & DOCUMENT PPD TEST IN 24 HRS Collection Time: 09/21/18  1:06 AM  
Result Value Ref Range PPD  Negative  
 mm Induration 0 mm CBC WITH AUTOMATED DIFF Collection Time: 09/21/18  3:56 AM  
Result Value Ref Range WBC 10.0 4.3 - 11.1 K/uL  
 RBC 4.59 4.23 - 5.6 M/uL  
 HGB 14.7 13.6 - 17.2 g/dL HCT 43.9 41.1 - 50.3 % MCV 95.6 79.6 - 97.8 FL  
 MCH 32.0 26.1 - 32.9 PG  
 MCHC 33.5 31.4 - 35.0 g/dL  
 RDW 12.9 % PLATELET 048 237 - 361 K/uL  MPV 10.6 9.4 - 12.3 FL  
 ABSOLUTE NRBC 0.00 0.0 - 0.2 K/uL  
 DF AUTOMATED NEUTROPHILS 65 43 - 78 % LYMPHOCYTES 26 13 - 44 % MONOCYTES 8 4.0 - 12.0 % EOSINOPHILS 0 (L) 0.5 - 7.8 % BASOPHILS 0 0.0 - 2.0 % IMMATURE GRANULOCYTES 0 0.0 - 5.0 %  
 ABS. NEUTROPHILS 6.6 1.7 - 8.2 K/UL  
 ABS. LYMPHOCYTES 2.6 0.5 - 4.6 K/UL  
 ABS. MONOCYTES 0.8 0.1 - 1.3 K/UL  
 ABS. EOSINOPHILS 0.0 0.0 - 0.8 K/UL  
 ABS. BASOPHILS 0.0 0.0 - 0.2 K/UL  
 ABS. IMM. GRANS. 0.0 0.0 - 0.5 K/UL METABOLIC PANEL, COMPREHENSIVE Collection Time: 09/21/18  3:56 AM  
Result Value Ref Range Sodium 137 136 - 145 mmol/L Potassium 3.6 3.5 - 5.1 mmol/L Chloride 102 98 - 107 mmol/L  
 CO2 25 21 - 32 mmol/L Anion gap 10 7 - 16 mmol/L Glucose 118 (H) 65 - 100 mg/dL BUN 11 6 - 23 MG/DL Creatinine 0.60 (L) 0.8 - 1.5 MG/DL  
 GFR est AA >60 >60 ml/min/1.73m2 GFR est non-AA >60 >60 ml/min/1.73m2 Calcium 8.3 8.3 - 10.4 MG/DL Bilirubin, total 1.0 0.2 - 1.1 MG/DL  
 ALT (SGPT) 65 12 - 65 U/L  
 AST (SGOT) 42 (H) 15 - 37 U/L Alk. phosphatase 78 50 - 136 U/L Protein, total 7.7 6.3 - 8.2 g/dL Albumin 3.5 3.5 - 5.0 g/dL Globulin 4.2 (H) 2.3 - 3.5 g/dL A-G Ratio 0.8 (L) 1.2 - 3.5 GLUCOSE, POC Collection Time: 09/21/18  6:20 AM  
Result Value Ref Range Glucose (POC) 131 (H) 65 - 100 mg/dL All Micro Results Procedure Component Value Units Date/Time CULTURE, BLOOD [[id number]] Collected:  09/19/18 2120 Order Status:  Completed Specimen:  Blood from Blood Updated:  09/20/18 1040 Special Requests: RIGHT HAND Culture result: NO GROWTH AFTER 12 HOURS     
 CULTURE, BLOOD [[id number]] Order Status:  Sent Specimen:  Blood from Blood No results found for this visit on 09/19/18. Current Meds: 
Current Facility-Administered Medications Medication Dose Route Frequency  0.9% sodium chloride infusion  100 mL/hr IntraVENous CONTINUOUS  
  insulin lispro (HUMALOG) injection   SubCUTAneous AC&HS  sodium chloride (NS) flush 5-10 mL  5-10 mL IntraVENous Q8H  
 sodium chloride (NS) flush 5-10 mL  5-10 mL IntraVENous PRN  
 sodium chloride (NS) flush 5-10 mL  5-10 mL IntraVENous Q8H  
 sodium chloride (NS) flush 5-10 mL  5-10 mL IntraVENous PRN  
 ondansetron (ZOFRAN) injection 4 mg  4 mg IntraVENous Q4H PRN  
 enoxaparin (LOVENOX) injection 40 mg  40 mg SubCUTAneous Q24H  
 LORazepam (ATIVAN) injection 1 mg  1 mg IntraVENous Q4H PRN  
 haloperidol lactate (HALDOL) injection 5 mg  5 mg IntraMUSCular Q8H PRN Other Studies (last 24 hours): No results found. Assessment and Plan:  
 
Hospital Problems as of 9/21/2018  Never Reviewed Codes Class Noted - Resolved POA Hypoglycemia ICD-10-CM: E16.2 ICD-9-CM: 251.2  9/19/2018 - Present Unknown Acute encephalopathy ICD-10-CM: G93.40 ICD-9-CM: 348.30  9/19/2018 - Present Yes Elevated liver enzymes ICD-10-CM: R74.8 ICD-9-CM: 790.5  9/19/2018 - Present Yes Type 2 diabetes mellitus (HCC) (Chronic) ICD-10-CM: E11.9 ICD-9-CM: 250.00  9/19/2018 - Present Yes Depression (Chronic) ICD-10-CM: F32.9 ICD-9-CM: 530  9/19/2018 - Present Yes Hypercarbia ICD-10-CM: R06.89 
ICD-9-CM: 786.09  9/19/2018 - Present Yes Plan: · Acute metabolic encephalopathy: still has pending UA and hepatitis panel/ serial glucoses have improved, mentation improving with delirium management, ok to remove UE restraints · Hypoglycemia with DM2: continue SSI, will need restart of basal insulin as glucoses improve,  continue DM2 diet · Depression: no scheduled medications, prn ativan/ haldol · Elevated LFTs: followup trends and  Hepatitis panel · Hyponatremia: due to hyperglycemia, resolved 
  
Discharge planning:  PPD 
DVT ppx: lovenox Code status:  Full Estimated LOS:  Greater than 2 midnights Risk:  high Care plan: unable to obtain Signed: Rafat Casarez MD

## 2018-09-21 NOTE — PROGRESS NOTES
Dispo update:  Call to [facility]. Cj Jack at [facility] intake says to hold off transfer, their physician may want Mr. Shayan Pena to stay at [facility] one more night to make sure he is stable for transfer. Dr. Shyam Lindsey [facility]) to call Dr. Kade Bautista ([facility]). Await physician discussion. [address] ambulance pickup scheduled for 1 pm was cancelled and Mr. Shayan Pena put on \"will call\" status. To arrange transport later today, call [address] dispatch at [phone].

## 2018-09-21 NOTE — PROGRESS NOTES
Patient and sitter notified of need for 2nd urine specimen. Both verbalized understanding. Container given to sitter.

## 2018-09-21 NOTE — PROGRESS NOTES
Roxana from [place] called for an update. Notified her that report was called to [facility], update given and notified will return today. Joana Lopez states that she was unaware of his return

## 2018-09-21 NOTE — PROGRESS NOTES
Spoke to Patricia in the intake department at [facility] for [facility]). They will accept Mr. Shayan Pena back to their inpatient psychiatric program.   
 
[facility] ambulance transport arranged for 1 pm to return Mr. Shayan Pena of room 215 to [facility]), unit #2, report [phone], accepting physician is Dr. Shyam Lindsey. Mr. Shayan Pena is still under psychiatric commitment for suicidal ideations (SI). This plan is ok with Rema Pena, Dr. Kade Bautista and with [facility]IS[facility] Painting.

## 2018-09-21 NOTE — PROGRESS NOTES
Problem: Mobility Impaired (Adult and Pediatric) Goal: *Acute Goals and Plan of Care (Insert Text) PHYSICAL THERAPY: Initial Assessment, Discharge, Treatment Day: Day of Assessment, AM 9/21/2018 INPATIENT: Hospital Day: 3 [place] / Product Type: Medicare /  
  
NAME/AGE/GENDER: Shania Cui is a 43 y.o. male PRIMARY DIAGNOSIS: Hypoglycemia <principal problem not specified> <principal problem not specified> 
  
  
ICD-10: Treatment Diagnosis:  
 · Generalized Muscle Weakness (M62.81) Precaution/Allergies: Toradol [ketorolac] and Tramadol ASSESSMENT:  
 
Mr. Tavia Trejo is ambulating out of restroom finishing working with OT upon contact. Pt is oriented to person only this session. Per chart, pt is from [facility]. Pt is confused but cooperative throughout evaluation. Pt ambulated out of room into hallway furthering gait distance 250 ft with SBA. No unsteadiness or LOB noted with gait. Pt returned to room and left sitting up in recliner with all needs met and within reach. No further skilled PT interventions indicated at this time. Pt appears to be functioning at baseline mobility. This section established at most recent assessment PROBLEM LIST (Impairments causing functional limitations): 1. Decreased Cognition INTERVENTIONS PLANNED: (Benefits and precautions of physical therapy have been discussed with the patient.) 1. n/a  
 
TREATMENT PLAN: Frequency/Duration: 1x visit RECOMMENDED REHABILITATION/EQUIPMENT: (at time of discharge pending progress): Due to the probability of continued deficits (see above) this patient will not likely need continued skilled physical therapy after discharge. Equipment:  
? None at this time HISTORY:  
History of Present Injury/Illness (Reason for Referral): 
See H&P below Mr. Tavia Trejo is a 42 yo male with PMH of anxiety/depression admitted to [facility] for suicidal ideation and xanax detox who is evaluated for the 2nd time today with worsening confusion. He has apparently been weaned off xanax and then developed confusion with first ED workup being unrevealing with negative CT head, labs. He returned to [facility] and developed worsening confusion with hypoglycemia in the setting of DM2, he has been receiving insulin, prn ativan and haldol. He is presently in 4 point restraints, agitated and kicking and yelling. Glucose has improved with D50 and now on D10 IVF. He received IV thiamine prior to dextrose. UDS pending, ETOH negative, asa/tylenol negative. He has mild leukocytosis, CXR negative, pending UA, LFTS elevated. EKG from earlier today with normal QTC Past Medical History/Comorbidities:  
Mr. Lilibeth Beck  has no past medical history on file. Mr. Lilibeth Beck  has no past surgical history on file. Social History/Living Environment:  
Home Environment:  (from [facility]) Prior Level of Function/Work/Activity: 
From [facility] Number of Personal Factors/Comorbidities that affect the Plan of Care: 1-2: MODERATE COMPLEXITY EXAMINATION:  
Most Recent Physical Functioning:  
Gross Assessment: 
AROM: Within functional limits Strength: Within functional limits Coordination: Within functional limits Posture: 
  
Balance: 
Sitting: Intact; Without support Standing: Intact; Without support Bed Mobility: 
  
Wheelchair Mobility: 
  
Transfers: 
  
Gait: 
  
Speed/Jazzy: Accelerated Distance (ft): 250 Feet (ft) Assistive Device:  (none) Ambulation - Level of Assistance: Stand-by assistance Body Structures Involved: 1. Muscles Body Functions Affected: 1. Mental Activities and Participation Affected: 1. General Tasks and Demands 2. Mobility 3. Self Care 4. Domestic Life 5. Interpersonal Interactions and Relationships 6. Community, Social and [place] Number of elements that affect the Plan of Care: 4+: HIGH COMPLEXITY CLINICAL PRESENTATION:  
 Presentation: Stable and uncomplicated: LOW COMPLEXITY CLINICAL DECISION MAKING:  
[facility] AM-PAC 6 Clicks Basic Mobility Inpatient Short Form How much difficulty does the patient currently have. .. Unable A Lot A Little None 1. Turning over in bed (including adjusting bedclothes, sheets and blankets)? [] 1   [] 2   [] 3   [x] 4  
2. Sitting down on and standing up from a chair with arms ( e.g., wheelchair, bedside commode, etc.)   [] 1   [] 2   [] 3   [x] 4  
3. Moving from lying on back to sitting on the side of the bed? [] 1   [] 2   [] 3   [x] 4 How much help from another person does the patient currently need. .. Total A Lot A Little None 4. Moving to and from a bed to a chair (including a wheelchair)? [] 1   [] 2   [] 3   [x] 4  
5. Need to walk in hospital room? [] 1   [] 2   [] 3   [x] 4  
6. Climbing 3-5 steps with a railing? [] 1   [] 2   [] 3   [x] 4  
© 2007, Trustees of [facility], under license to [employer]. All rights reserved Score:  Initial: 24 Most Recent: X (Date: -- ) Interpretation of Tool:  Represents activities that are increasingly more difficult (i.e. Bed mobility, Transfers, Gait). Score 24 23 22-20 19-15 14-10 9-7 6 Modifier CH CI CJ CK CL CM CN   
 
? Mobility - Walking and Moving Around:  
  - CURRENT STATUS: CH - 0% impaired, limited or restricted  - GOAL STATUS: CH - 0% impaired, limited or restricted  - D/C STATUS:  CH - 0% impaired, limited or restricted [place] / [place] / Product Type: Medicare /   
 
  
Use of outcome tool(s) and clinical judgement create a POC that gives a: Clear prediction of patient's progress: LOW COMPLEXITY  
  
 
 
 
TREATMENT:  
(In addition to Assessment/Re-Assessment sessions the following treatments were rendered) Pre-treatment Symptoms/Complaints:  none Pain: Initial:  
Pain Intensity 1: 0  Post Session:  0/10 Assessment/Reassessment only, no treatment provided today Braces/Orthotics/Lines/Etc:  
· IV 
· O2 Device: Room air Treatment/Session Assessment:   
· Response to Treatment:  Ambulated 250 ft with SBA · Interdisciplinary Collaboration:  
o Physical Therapist 
o Occupational Therapist 
o Registered Nurse · After treatment position/precautions:  
o Up in chair 
o Bed/Chair-wheels locked 
o Bed in low position 
o Call light within reach 
o RN notified 
o sitter at bedside Total Treatment Duration: PT Patient Time In/Time Out Time In: 0771 Time Out: 1145 Mariela Georgiana 55

## 2018-09-21 NOTE — PROGRESS NOTES
Report called to Viktoria Peterson at [facility], [phone], unit 2- SI commitment, Dr. Brigitte Santo

## 2018-09-21 NOTE — PROGRESS NOTES
Patient in bed eating jello. Latasha present. Alert, oriented x 4, able to state name, 9th month, 2018, in hospital and here for drug abuse. Denies anxiety at this time

## 2018-09-21 NOTE — PROGRESS NOTES
Dr Lindalou Goldberg notified that patient is requesting methadone 160 mg for his chronic neck pain, back pain and sciatica. See order for tylenol. Patient notified that the doctor was notified and tylenol was ordered.

## 2018-09-21 NOTE — PROGRESS NOTES
Problem: Self Care Deficits Care Plan (Adult) Goal: *Acute Goals and Plan of Care (Insert Text) OCCUPATIONAL THERAPY: Initial Assessment and Discharge 9/21/2018 INPATIENT: Hospital Day: 3 [place] / Product Type: Medicare /  
  
NAME/AGE/GENDER: Divina Chopra is a 43 y.o. male PRIMARY DIAGNOSIS:  Hypoglycemia <principal problem not specified> <principal problem not specified> 
  
  
ICD-10: Treatment Diagnosis: · Dizziness and Giddiness (R42) Precautions/Allergies: Toradol [ketorolac] and Tramadol ASSESSMENT:  
 
Mr. Gabriele James was admitted from [facility] with hypoglycemia, AMS, and suicidal ideation. Pt reports that he was independent with ADLs and ambulating without an AD at baseline. This session, pt presented confused with AMS, A&O to self only. Pt demonstrated independence with ADLs and with mobility for ADLs, was primarily limited by psychiatric and cognitive status. Pt does not have any further rehab needs in this setting or post d/c. This section established at most recent assessment PROBLEM LIST (Impairments causing functional limitations): 1. Decreased Cognition INTERVENTIONS PLANNED: (Benefits and precautions of occupational therapy have been discussed with the patient.) 1. n/a  
 
TREATMENT PLAN: Frequency/Duration: Follow patient 0 to address above goals. Rehabilitation Potential For Stated Goals: n/a  
 
RECOMMENDED REHABILITATION/EQUIPMENT: (at time of discharge pending progress): Due to the probability of continued deficits (see above) this patient will not likely need continued skilled occupational therapy after discharge. Equipment:  
? None at this time OCCUPATIONAL PROFILE AND HISTORY:  
History of Present Injury/Illness (Reason for Referral): 
See H&P Past Medical History/Comorbidities:  
Mr. Gabriele James  has no past medical history on file. Mr. Gabriele James  has no past surgical history on file. Social History/Living Environment: Home Environment:  (from [employer]) Prior Level of Function/Work/Activity: 
Independent with ADLs, from [employer] Number of Personal Factors/Comorbidities that affect the Plan of Care: Expanded review of therapy/medical records (1-2):  MODERATE COMPLEXITY ASSESSMENT OF OCCUPATIONAL PERFORMANCE[de-identified]  
Activities of Daily Living:  
Basic ADLs (From Assessment) Complex ADLs (From Assessment) Feeding: Independent Oral Facial Hygiene/Grooming: Supervision Bathing: Supervision Upper Body Dressing: Supervision Lower Body Dressing: Supervision Toileting: Supervision Grooming/Bathing/Dressing Activities of Daily Living Grooming Grooming Assistance: Independent Toileting Toileting Assistance: Independent Functional Transfers Toilet Transfer : Stand-by assistance Bed/Mat Mobility Supine to Sit: Stand-by assistance Sit to Stand: Stand-by assistance Most Recent Physical Functioning:  
Gross Assessment: 
AROM: Within functional limits Strength: Within functional limits Coordination: Within functional limits Posture: 
  
Balance: 
Sitting: Intact; Without support Standing: Intact; Without support Bed Mobility: 
Supine to Sit: Stand-by assistance Wheelchair Mobility: 
  
Transfers: 
Sit to Stand: Stand-by assistance Stand to Sit: Stand-by assistance Patient Vitals for the past 6 hrs: 
 BP SpO2 Pulse  
09/21/18 0732 138/85 95 % 100 Mental Status Neurologic State: Alert, Confused Orientation Level: Oriented to person, Disoriented to place, Disoriented to situation Cognition: Appropriate for age attention/concentration, Follows commands Perception: Appears intact Physical Skills Involved: 1. Pain (Chronic) Cognitive Skills Affected (resulting in the inability to perform in a timely and safe manner): 1. Executive Function 2. Short Term Recall 3. Long Term Memory 4. Sustained Attention 5. Divided Attention 6. Comprehension Psychosocial Skills Affected: 1. Emotional Regulation 2. Self-Awareness 3. Awareness of Others 4. Social Roles Number of elements that affect the Plan of Care: 3-5:  MODERATE COMPLEXITY CLINICAL DECISION MAKIN[address] [place] [address] AM-PAC 6 Clicks Daily Activity Inpatient Short Form How much help from another person does the patient currently need. .. Total A Lot A Little None 1. Putting on and taking off regular lower body clothing? [] 1   [] 2   [] 3   [x] 4  
2. Bathing (including washing, rinsing, drying)? [] 1   [] 2   [] 3   [x] 4  
3. Toileting, which includes using toilet, bedpan or urinal?   [] 1   [] 2   [] 3   [x] 4  
4. Putting on and taking off regular upper body clothing? [] 1   [] 2   [] 3   [x] 4  
5. Taking care of personal grooming such as brushing teeth? [] 1   [] 2   [] 3   [x] 4  
6. Eating meals? [] 1   [] 2   [] 3   [x] 4  
© , Trustees of [address] [place] [address], under license to [employer]. All rights reserved Score:  Initial: 24 Most Recent: X (Date: -- ) Interpretation of Tool:  Represents activities that are increasingly more difficult (i.e. Bed mobility, Transfers, Gait). Score 24 23 22-20 19-15 14-10 9-7 6 Modifier CH CI CJ CK CL CM CN   
 
? Self Care:  
  - CURRENT STATUS: CH - 0% impaired, limited or restricted  - GOAL STATUS: CH - 0% impaired, limited or restricted  - D/C STATUS:  CH - 0% impaired, limited or restricted [place] / Product Type: Medicare /   
 
  
Use of outcome tool(s) and clinical judgement create a POC that gives a: LOW COMPLEXITY  
 
 
 
TREATMENT:  
(In addition to Assessment/Re-Assessment sessions the following treatments were rendered) Pre-treatment Symptoms/Complaints:   
Pain: Initial:  
Pain Intensity 1: 0  Post Session:  0 Assessment/Reassessment only, no treatment provided today Braces/Orthotics/Lines/Etc:  
· IV 
 · O2 Device: Room air Treatment/Session Assessment:   
· Response to Treatment:  No adverse reaction · Interdisciplinary Collaboration:  
o Physical Therapist 
o Occupational Therapist 
o Registered Nurse · After treatment position/precautions:  
o remained with PT for session Total Treatment Duration: 
 9:33 
9:41 Rachel Haywood, OT

## 2018-09-21 NOTE — PROGRESS NOTES
Speech language pathology: bedside swallow note: iNITIAL ASSESSMENT AND dISCHARGE 
 
NAME/AGE/GENDER: Minesh Kidd is a 43 y.o. male DATE: 9/21/2018 PRIMARY DIAGNOSIS: Hypoglycemia ICD-10: Treatment Diagnosis: Oropharyngeal dysphagia (R13.12) INTERDISCIPLINARY COLLABORATION: Speech Therapist 
PRECAUTIONS/ALLERGIES: Toradol [ketorolac] and Tramadol ASSESSMENT:Based on the objective data described below, Mr. Joe Cruz presents with oral and pharyngeal phase of swallow within functional limits with no overt signs or symptoms of aspiration observed with any consistency assessed. Swallows of all textures timely, clear to cervical auscultation and with adequate laryngeal excursion. Voice clear after swallow. No oral residue observed. Patient reports tongue pain. No noted lesion observed. Patient also endorses sore throat, but able to swallow all consistencies assessed without obvious pain. Recommend continue current regular texture diet and thin liquids. Give meds with water. No further skilled speech/swallow intervention currently indicated. PLAN OF CARE:  
Patient will benefit from skilled intervention to address the following impairments. RECOMMENDATIONS AND PLANNED INTERVENTIONS (Benefits and precautions of therapy have been discussed with the patient.): 
· continue prescribed diet MEDICATIONS: 
· With water RECOMMENDED REHABILITATION/EQUIPMENT: (at time of discharge pending progress): Due to the probability of continued deficits (see above) this patient will not likely need continued skilled speech therapy after discharge. SUBJECTIVE:  
Patient cooperative. Sitter present. History of Present Injury/Illness: Mr. Joe Cruz  has no past medical history on file. Ann-Marie Taylor He also  has no past surgical history on file. Present Symptoms: AMS Pain Intensity 1: 0 Current Medications: No current facility-administered medications on file prior to encounter. No current outpatient prescriptions on file prior to encounter. Current Dietary Status:  Regular textures, thin liquids Social History/Home Situation:  
Home Environment:  (from [facility]) OBJECTIVE:  
Respiratory Status:  Room air Cognitive and Communication Status: 
Neurologic State: Alert Orientation Level: Appropriate for age Cognition: Follows commands Perception: Appears intact Perseveration: No perseveration noted Safety/Judgement: Awareness of environment BEDSIDE SWALLOW EVALUATION Oral Assessment: 
Oral Assessment Labial: No impairment Dentition: Limited;Natural;Poor Oral Hygiene: adequate Lingual: No impairment P.O. Trials: 
Patient Position: upright in bed The patient was given the following:  
Consistency Presented: Thin liquid; Solid;Puree;Mixed consistency How Presented: Self-fed/presented;SLP-fed/presented;Cup/sip;Cup/gulp; Spoon;Straw;Successive swallows ORAL PHASE: 
Bolus Acceptance: No impairment Bolus Formation/Control: No impairment Propulsion: No impairment Oral Residue: None PHARYNGEAL PHASE: 
Initiation of Swallow: No impairment Laryngeal Elevation: Functional 
Aspiration Signs/Symptoms: None Vocal Quality: No impairment Pharyngeal Phase Characteristics: No impairment, issues, or problems OTHER OBSERVATIONS: 
Rate/bite size: WNL Endurance: WNL Tool Used: Dysphagia Outcome and Severity Scale (RACHEAL) Score Comments Normal Diet  [] 7 With no strategies or extra time needed Functional Swallow  [x] 6 May have mild oral or pharyngeal delay Mild Dysphagia 
  [] 5 Which may require one diet consistency restricted (those who demonstrate penetration which is entirely cleared on MBS would be included) Mild-Moderate Dysphagia  [] 4 With 1-2 diet consistencies restricted Moderate Dysphagia  [] 3 With 2 or more diet consistencies restricted Moderately Severe Dysphagia  [] 2 With partial PO strategies (trials with ST only) Severe Dysphagia  [] 1 With inability to tolerate any PO safely Score:  Initial: 6 Most Recent: X (Date: -- ) Interpretation of Tool: The Dysphagia Outcome and Severity Scale (RACHEAL) is a simple, easy-to-use, 7-point scale developed to systematically rate the functional severity of dysphagia based on objective assessment and make recommendations for diet level, independence level, and type of nutrition. Score 7 6 5 4 3 2 1 Modifier CH CI CJ CK CL CM CN ? Swallowing:  
  - CURRENT STATUS: CI - 1%-19% impaired, limited or restricted  - GOAL STATUS:  CI - 1%-19% impaired, limited or restricted  - D/C STATUS:  CI - 1%-19% impaired, limited or restricted [place]: [place] / Plan[place] / Product Type: Medicare /  
 
TREATMENT:  
 (In addition to Assessment/Re-Assessment sessions the following treatments were rendered) Assessment/Reassessment only, no treatment provided today. Safety: After treatment position/precautions: · Upright in Bed Treatment Assessment:  No further skilled speech/swallow intervention currently recommended. Total Treatment Duration: 
Time In: 6961 Time Out: 115[address] MA, CCC-SLP

## 2018-09-21 NOTE — PROGRESS NOTES
END OF SHIFT NOTE: 
 
INTAKE/OUTPUT 
09/19 0701 - 09/20 0700 In: -  
Out: 060 [DNAOI:032] Voiding: NO 
Catheter: YES Drain:   
 
 
 
 
 
Flatus: Patient does have flatus present. Stool:  0 occurrences. Characteristics: 
  
 
Emesis: 0 occurrences. Characteristics: VITAL SIGNS Patient Vitals for the past 12 hrs: 
 Temp Pulse Resp BP SpO2  
09/20/18 1535 98.8 °F (37.1 °C) (!) 105 18 139/86 94 % 09/20/18 1125 98.4 °F (36.9 °C) 88 17 131/79 96 % Pain Assessment Pain Intensity 1: 0 (09/19/18 2100) Patient Stated Pain Goal: 0 Ambulating No 
 
Shift report given to oncoming nurse at the bedside.  
 
Sundeep Harrington RN

## 2018-09-21 NOTE — PROGRESS NOTES
Dispo update:  Spoke to Dr. Angel Sharp MD, then called [facility] at [phone] and spoke to Dawson Connolly in intake. Faxed 21 pages of clinical with question:  Can you accept Mr. Tavia Trejo back to inpatient at [facility] today? Await their review.

## 2018-09-22 LAB
ALBUMIN SERPL-MCNC: 3.3 G/DL (ref 3.5–5)
ALBUMIN/GLOB SERPL: 0.8 {RATIO} (ref 1.2–3.5)
ALP SERPL-CCNC: 73 U/L (ref 50–136)
ALT SERPL-CCNC: 56 U/L (ref 12–65)
ANION GAP SERPL CALC-SCNC: 7 MMOL/L (ref 7–16)
AST SERPL-CCNC: 38 U/L (ref 15–37)
BASOPHILS # BLD: 0 K/UL (ref 0–0.2)
BASOPHILS NFR BLD: 1 % (ref 0–2)
BILIRUB SERPL-MCNC: 0.7 MG/DL (ref 0.2–1.1)
BUN SERPL-MCNC: 6 MG/DL (ref 6–23)
CALCIUM SERPL-MCNC: 8.1 MG/DL (ref 8.3–10.4)
CHLORIDE SERPL-SCNC: 103 MMOL/L (ref 98–107)
CO2 SERPL-SCNC: 27 MMOL/L (ref 21–32)
CREAT SERPL-MCNC: 0.63 MG/DL (ref 0.8–1.5)
DIFFERENTIAL METHOD BLD: ABNORMAL
EOSINOPHIL # BLD: 0 K/UL (ref 0–0.8)
EOSINOPHIL NFR BLD: 0 % (ref 0.5–7.8)
ERYTHROCYTE [DISTWIDTH] IN BLOOD BY AUTOMATED COUNT: 12.6 %
GLOBULIN SER CALC-MCNC: 4.1 G/DL (ref 2.3–3.5)
GLUCOSE BLD STRIP.AUTO-MCNC: 147 MG/DL (ref 65–100)
GLUCOSE BLD STRIP.AUTO-MCNC: 206 MG/DL (ref 65–100)
GLUCOSE BLD STRIP.AUTO-MCNC: 216 MG/DL (ref 65–100)
GLUCOSE BLD STRIP.AUTO-MCNC: 239 MG/DL (ref 65–100)
GLUCOSE SERPL-MCNC: 140 MG/DL (ref 65–100)
HCT VFR BLD AUTO: 42 % (ref 41.1–50.3)
HGB BLD-MCNC: 14.3 G/DL (ref 13.6–17.2)
IMM GRANULOCYTES # BLD: 0 K/UL (ref 0–0.5)
IMM GRANULOCYTES NFR BLD AUTO: 0 % (ref 0–5)
LYMPHOCYTES # BLD: 1.7 K/UL (ref 0.5–4.6)
LYMPHOCYTES NFR BLD: 31 % (ref 13–44)
MCH RBC QN AUTO: 32 PG (ref 26.1–32.9)
MCHC RBC AUTO-ENTMCNC: 34 G/DL (ref 31.4–35)
MCV RBC AUTO: 94 FL (ref 79.6–97.8)
MM INDURATION POC: 0 MM (ref 0–5)
MONOCYTES # BLD: 0.6 K/UL (ref 0.1–1.3)
MONOCYTES NFR BLD: 11 % (ref 4–12)
NEUTS SEG # BLD: 3.3 K/UL (ref 1.7–8.2)
NEUTS SEG NFR BLD: 58 % (ref 43–78)
NRBC # BLD: 0 K/UL (ref 0–0.2)
PLATELET # BLD AUTO: 156 K/UL (ref 150–450)
PMV BLD AUTO: 10.2 FL (ref 9.4–12.3)
POTASSIUM SERPL-SCNC: 3.4 MMOL/L (ref 3.5–5.1)
PPD POC: NEGATIVE NEGATIVE
PROT SERPL-MCNC: 7.4 G/DL (ref 6.3–8.2)
RBC # BLD AUTO: 4.47 M/UL (ref 4.23–5.6)
SODIUM SERPL-SCNC: 137 MMOL/L (ref 136–145)
VIT B1 BLD-SCNC: 248.1 NMOL/L (ref 66.5–200)
WBC # BLD AUTO: 5.6 K/UL (ref 4.3–11.1)

## 2018-09-22 PROCEDURE — 74011250637 HC RX REV CODE- 250/637: Performed by: INTERNAL MEDICINE

## 2018-09-22 PROCEDURE — 65270000029 HC RM PRIVATE

## 2018-09-22 PROCEDURE — 74011636637 HC RX REV CODE- 636/637: Performed by: INTERNAL MEDICINE

## 2018-09-22 PROCEDURE — 74011250636 HC RX REV CODE- 250/636: Performed by: INTERNAL MEDICINE

## 2018-09-22 PROCEDURE — 77030020263 HC SOL INJ SOD CL0.9% LFCR 1000ML

## 2018-09-22 PROCEDURE — 36415 COLL VENOUS BLD VENIPUNCTURE: CPT

## 2018-09-22 PROCEDURE — 80053 COMPREHEN METABOLIC PANEL: CPT

## 2018-09-22 PROCEDURE — 85025 COMPLETE CBC W/AUTO DIFF WBC: CPT

## 2018-09-22 PROCEDURE — 82962 GLUCOSE BLOOD TEST: CPT

## 2018-09-22 RX ORDER — SODIUM CHLORIDE 9 MG/ML
100 INJECTION, SOLUTION INTRAVENOUS CONTINUOUS
Status: DISCONTINUED | OUTPATIENT
Start: 2018-09-22 | End: 2018-09-22

## 2018-09-22 RX ORDER — ONDANSETRON 4 MG/1
4 TABLET, ORALLY DISINTEGRATING ORAL
Status: DISCONTINUED | OUTPATIENT
Start: 2018-09-22 | End: 2018-09-23 | Stop reason: HOSPADM

## 2018-09-22 RX ORDER — POTASSIUM CHLORIDE 20 MEQ/1
40 TABLET, EXTENDED RELEASE ORAL
Status: COMPLETED | OUTPATIENT
Start: 2018-09-22 | End: 2018-09-22

## 2018-09-22 RX ORDER — LORAZEPAM 1 MG/1
1 TABLET ORAL
Status: DISCONTINUED | OUTPATIENT
Start: 2018-09-22 | End: 2018-09-23 | Stop reason: HOSPADM

## 2018-09-22 RX ORDER — CEFPODOXIME PROXETIL 200 MG/1
100 TABLET, FILM COATED ORAL EVERY 12 HOURS
Status: DISCONTINUED | OUTPATIENT
Start: 2018-09-22 | End: 2018-09-23 | Stop reason: HOSPADM

## 2018-09-22 RX ADMIN — LORAZEPAM 1 MG: 2 INJECTION INTRAMUSCULAR; INTRAVENOUS at 12:00

## 2018-09-22 RX ADMIN — Medication 10 ML: at 16:42

## 2018-09-22 RX ADMIN — SODIUM CHLORIDE 100 ML/HR: 900 INJECTION, SOLUTION INTRAVENOUS at 11:38

## 2018-09-22 RX ADMIN — ENOXAPARIN SODIUM 40 MG: 40 INJECTION, SOLUTION INTRAVENOUS; SUBCUTANEOUS at 23:24

## 2018-09-22 RX ADMIN — LORAZEPAM 1 MG: 1 TABLET ORAL at 17:10

## 2018-09-22 RX ADMIN — LORAZEPAM 1 MG: 1 TABLET ORAL at 09:48

## 2018-09-22 RX ADMIN — ONDANSETRON 4 MG: 2 INJECTION INTRAMUSCULAR; INTRAVENOUS at 11:55

## 2018-09-22 RX ADMIN — POTASSIUM CHLORIDE 40 MEQ: 20 TABLET, EXTENDED RELEASE ORAL at 09:48

## 2018-09-22 RX ADMIN — INSULIN GLARGINE 10 UNITS: 100 INJECTION, SOLUTION SUBCUTANEOUS at 21:53

## 2018-09-22 RX ADMIN — CEFPODOXIME PROXETIL 100 MG: 200 TABLET, FILM COATED ORAL at 20:28

## 2018-09-22 RX ADMIN — INSULIN LISPRO 4 UNITS: 100 INJECTION, SOLUTION INTRAVENOUS; SUBCUTANEOUS at 16:41

## 2018-09-22 RX ADMIN — LORAZEPAM 1 MG: 1 TABLET ORAL at 21:16

## 2018-09-22 RX ADMIN — CEFPODOXIME PROXETIL 100 MG: 200 TABLET, FILM COATED ORAL at 09:57

## 2018-09-22 RX ADMIN — SODIUM CHLORIDE 100 ML/HR: 900 INJECTION, SOLUTION INTRAVENOUS at 01:29

## 2018-09-22 RX ADMIN — ONDANSETRON 4 MG: 4 TABLET, ORALLY DISINTEGRATING ORAL at 21:17

## 2018-09-22 RX ADMIN — INSULIN LISPRO 4 UNITS: 100 INJECTION, SOLUTION INTRAVENOUS; SUBCUTANEOUS at 21:54

## 2018-09-22 RX ADMIN — INSULIN LISPRO 4 UNITS: 100 INJECTION, SOLUTION INTRAVENOUS; SUBCUTANEOUS at 11:42

## 2018-09-22 RX ADMIN — ACETAMINOPHEN 500 MG: 500 TABLET, FILM COATED ORAL at 20:28

## 2018-09-22 NOTE — PROGRESS NOTES
Hospitalist Progress Note Admit Date:  2018  2:56 PM  
Name:  Payton Pastor Age:  43 y.o. 
:  1975 MRN:  [id number] PCP:  Not On File Bsi Treatment Team: Attending Provider: Cheryle Mariscal, MD; Consulting Provider: Cheryle Mariscal, MD; Care Manager: Latonya Marin, RN; Utilization Review: Clara Renae; Speech Language Pathologist: AMAN Hill Subjective:  
 
 
Mr. Shorty Connolly is a 44 yo male with PMH of anxiety/depression admitted to [facility] for suicidal ideation and xanax detox who is evaluated for the 2nd time in the ED with worsening confusion. He has apparently been weaned off xanax and then developed confusion with first ED workup being unrevealing with negative CT head, labs. He returned to [facility] and developed worsening confusion with hypoglycemia in the setting of DM2, he has been receiving insulin, prn ativan and haldol while at [facility]. He was  in 4 point restraints in the ED, agitated and kicking and yelling. Glucose has improved with D50 and on D10 IVF. He received IV thiamine prior to dextrose. UDS and  ETOH negative, asa/tylenol negative. He had mild leukocytosis, CXR negative, UA showed evidence of UTI, LFTS elevated- HIV negative and HEPC positive. EKG from   with normal QTC. He is day 2 antibiotics for UTI. He has improved regarding overall agitation/ confusion and was likely in benzodiazepine withdrawals; sitter present. He has been no longer requiring restraints and has haldol/ativan prn. Tele psyche consulted for medication assistance and agrees with haldol/ativan. Case management consulted. He will need to return to [facility] at discharge. 18 feels nauseated with loose BM off and on since [facility] admit, some cough and shortness of breath Objective:  
 
Patient Vitals for the past 24 hrs: 
 Temp Pulse Resp BP SpO2  
18 0702 98.3 °F (36.8 °C) 79 18 137/82 93 % 18 0413 99.1 °F (37.3 °C) 78 18 131/67 93 % 09/21/18 2330 99.5 °F (37.5 °C) 99 18 141/70 94 % 09/21/18 1940 98.1 °F (36.7 °C) 100 18 131/86 94 % 09/21/18 1535 98.9 °F (37.2 °C) 89 18 158/88 94 % 09/21/18 1108 98.6 °F (37 °C) (!) 101 19 151/79 94 % Oxygen Therapy O2 Sat (%): 93 % (09/22/18 0702) Pulse via Oximetry: 106 beats per minute (09/19/18 1725) O2 Device: Room air (09/22/18 0725) Intake/Output Summary (Last 24 hours) at 09/22/18 1001 Last data filed at 09/22/18 9998 Gross per 24 hour Intake             2592 ml Output              850 ml Net             1742 ml General:    Well nourished. Alert. Calm and verbal 
CV:   RRR. No murmur, rub, or gallop. No edema Lungs:   CTAB. No wheezing, rhonchi, or rales. Abdomen:   Soft, nontender, nondistended. obese Extremities: Warm and dry. Skin:     No rashes or jaundice. Neuro:  No gross focal deficits Psyche:  Alert to self, year and place Data Review: 
I have reviewed all labs, meds, telemetry events, and studies from the last 24 hours: 
 
Recent Results (from the past 24 hour(s)) GLUCOSE, POC Collection Time: 09/21/18 10:51 AM  
Result Value Ref Range Glucose (POC) 272 (H) 65 - 100 mg/dL URINALYSIS W/ RFLX MICROSCOPIC Collection Time: 09/21/18  1:08 PM  
Result Value Ref Range Color YELLOW Appearance CLEAR Specific gravity 1.016 1.001 - 1.023    
 pH (UA) 6.0 5.0 - 9.0 Protein NEGATIVE  NEG mg/dL Glucose >1000 mg/dL Ketone 15 (A) NEG mg/dL Bilirubin NEGATIVE  NEG Blood LARGE (A) NEG Urobilinogen 1.0 0.2 - 1.0 EU/dL Nitrites NEGATIVE  NEG Leukocyte Esterase SMALL (A) NEG    
 WBC 20-50 0 /hpf  
 RBC 20-50 0 /hpf Epithelial cells 0 0 /hpf Bacteria 0 0 /hpf Casts 0 0 /lpf  
GLUCOSE, POC Collection Time: 09/21/18  2:34 PM  
Result Value Ref Range Glucose (POC) 255 (H) 65 - 100 mg/dL GLUCOSE, POC Collection Time: 09/21/18  4:04 PM  
Result Value Ref Range Glucose (POC) 256 (H) 65 - 100 mg/dL CULTURE, URINE Collection Time: 09/21/18  4:15 PM  
Result Value Ref Range Special Requests: NO SPECIAL REQUESTS Culture result:     
  NO GROWTH AFTER SHORT PERIOD OF INCUBATION. FURTHER RESULTS TO FOLLOW AFTER OVERNIGHT INCUBATION. GLUCOSE, POC Collection Time: 09/21/18  9:20 PM  
Result Value Ref Range Glucose (POC) 184 (H) 65 - 100 mg/dL PLEASE READ & DOCUMENT PPD TEST IN 48 HRS Collection Time: 09/22/18  1:06 AM  
Result Value Ref Range PPD Negative Negative  
 mm Induration 0 mm CBC WITH AUTOMATED DIFF Collection Time: 09/22/18  4:47 AM  
Result Value Ref Range WBC 5.6 4.3 - 11.1 K/uL  
 RBC 4.47 4.23 - 5.6 M/uL  
 HGB 14.3 13.6 - 17.2 g/dL HCT 42.0 41.1 - 50.3 % MCV 94.0 79.6 - 97.8 FL  
 MCH 32.0 26.1 - 32.9 PG  
 MCHC 34.0 31.4 - 35.0 g/dL  
 RDW 12.6 % PLATELET 781 362 - 269 K/uL MPV 10.2 9.4 - 12.3 FL ABSOLUTE NRBC 0.00 0.0 - 0.2 K/uL  
 DF AUTOMATED NEUTROPHILS 58 43 - 78 % LYMPHOCYTES 31 13 - 44 % MONOCYTES 11 4.0 - 12.0 % EOSINOPHILS 0 (L) 0.5 - 7.8 % BASOPHILS 1 0.0 - 2.0 % IMMATURE GRANULOCYTES 0 0.0 - 5.0 %  
 ABS. NEUTROPHILS 3.3 1.7 - 8.2 K/UL  
 ABS. LYMPHOCYTES 1.7 0.5 - 4.6 K/UL  
 ABS. MONOCYTES 0.6 0.1 - 1.3 K/UL  
 ABS. EOSINOPHILS 0.0 0.0 - 0.8 K/UL  
 ABS. BASOPHILS 0.0 0.0 - 0.2 K/UL  
 ABS. IMM. GRANS. 0.0 0.0 - 0.5 K/UL METABOLIC PANEL, COMPREHENSIVE Collection Time: 09/22/18  4:47 AM  
Result Value Ref Range Sodium 137 136 - 145 mmol/L Potassium 3.4 (L) 3.5 - 5.1 mmol/L Chloride 103 98 - 107 mmol/L  
 CO2 27 21 - 32 mmol/L Anion gap 7 7 - 16 mmol/L Glucose 140 (H) 65 - 100 mg/dL BUN 6 6 - 23 MG/DL Creatinine 0.63 (L) 0.8 - 1.5 MG/DL  
 GFR est AA >60 >60 ml/min/1.73m2 GFR est non-AA >60 >60 ml/min/1.73m2 Calcium 8.1 (L) 8.3 - 10.4 MG/DL  Bilirubin, total 0.7 0.2 - 1.1 MG/DL  
 ALT (SGPT) 56 12 - 65 U/L  
 AST (SGOT) 38 (H) 15 - 37 U/L  
 Alk. phosphatase 73 50 - 136 U/L Protein, total 7.4 6.3 - 8.2 g/dL Albumin 3.3 (L) 3.5 - 5.0 g/dL Globulin 4.1 (H) 2.3 - 3.5 g/dL A-G Ratio 0.8 (L) 1.2 - 3.5 GLUCOSE, POC Collection Time: 09/22/18  6:18 AM  
Result Value Ref Range Glucose (POC) 147 (H) 65 - 100 mg/dL All Micro Results Procedure Component Value Units Date/Time CULTURE, URINE [[id number]] Collected:  09/21/18 1615 Order Status:  Completed Specimen:  Urine from Clean catch Updated:  09/22/18 3330 Special Requests: NO SPECIAL REQUESTS Culture result:      
  NO GROWTH AFTER SHORT PERIOD OF INCUBATION. FURTHER RESULTS TO FOLLOW AFTER OVERNIGHT INCUBATION. CULTURE, BLOOD [[id number]] Collected:  09/19/18 2120 Order Status:  Completed Specimen:  Blood from Blood Updated:  09/22/18 5564 Special Requests: RIGHT HAND Culture result: NO GROWTH 3 DAYS     
 CULTURE, BLOOD [[id number]] Order Status:  Sent Specimen:  Blood from Blood No results found for this visit on 09/19/18. Current Meds: 
Current Facility-Administered Medications Medication Dose Route Frequency  cefpodoxime (VANTIN) tablet 100 mg  100 mg Oral Q12H  
 insulin glargine (LANTUS) injection 10 Units  10 Units SubCUTAneous QHS  LORazepam (ATIVAN) tablet 1 mg  1 mg Oral BID  acetaminophen (TYLENOL) tablet 500 mg  500 mg Oral Q6H PRN  
 insulin lispro (HUMALOG) injection   SubCUTAneous AC&HS  sodium chloride (NS) flush 5-10 mL  5-10 mL IntraVENous Q8H  
 sodium chloride (NS) flush 5-10 mL  5-10 mL IntraVENous PRN  
 sodium chloride (NS) flush 5-10 mL  5-10 mL IntraVENous Q8H  
 sodium chloride (NS) flush 5-10 mL  5-10 mL IntraVENous PRN  
 ondansetron (ZOFRAN) injection 4 mg  4 mg IntraVENous Q4H PRN  
 enoxaparin (LOVENOX) injection 40 mg  40 mg SubCUTAneous Q24H  
 LORazepam (ATIVAN) injection 1 mg  1 mg IntraVENous Q4H PRN  
  haloperidol lactate (HALDOL) injection 5 mg  5 mg IntraMUSCular Q8H PRN Other Studies (last 24 hours): No results found. Assessment and Plan:  
 
Hospital Problems as of 9/22/2018  Never Reviewed Codes Class Noted - Resolved POA Hypoglycemia ICD-10-CM: E16.2 ICD-9-CM: 251.2  9/19/2018 - Present Unknown Acute encephalopathy ICD-10-CM: G93.40 ICD-9-CM: 348.30  9/19/2018 - Present Yes Elevated liver enzymes ICD-10-CM: R74.8 ICD-9-CM: 790.5  9/19/2018 - Present Yes Type 2 diabetes mellitus (HCC) (Chronic) ICD-10-CM: E11.9 ICD-9-CM: 250.00  9/19/2018 - Present Yes Depression (Chronic) ICD-10-CM: F32.9 ICD-9-CM: 882  9/19/2018 - Present Yes Hypercarbia ICD-10-CM: R06.89 
ICD-9-CM: 786.09  9/19/2018 - Present Yes Plan: · Acute metabolic encephalopathy:resolved, likely due to benzodiazepine withdrawals , continue scheduled ativan and prn ativan/ haldol · Hypoglycemia with DM2: continue SSI, tolerant to restart of basal lantus as glucoses improve,  continue DM2 diet · Depression: no scheduled medications, prn ativan/ haldol · Elevated LFTs:HEPC C noted · Hyponatremia: due to hyperglycemia, resolved · Diarrhea: check stool cx and cdiff, supportive care and IVF 
· UTI: day 2 antibiotics, pending culture  
  
Discharge planning:  PPD 
DVT ppx: lovenox Code status:  Full Estimated LOS:  Greater than 2 midnights Risk:  high Care plan: unable to obtain Signed: Jb Estrada MD

## 2018-09-22 NOTE — PROGRESS NOTES
Patient states that he has had 2 diarrhea stools today and is still nauseated. Will administer zofran

## 2018-09-22 NOTE — PROGRESS NOTES
Spoke with Dr Elmira Vila about patient disposition, per Dr. Elmira Vila she will re-evaluate this afternoon

## 2018-09-23 LAB
ALBUMIN SERPL-MCNC: 3.4 G/DL (ref 3.5–5)
ALBUMIN/GLOB SERPL: 0.8 {RATIO} (ref 1.2–3.5)
ALP SERPL-CCNC: 72 U/L (ref 50–136)
ALT SERPL-CCNC: 57 U/L (ref 12–65)
ANION GAP SERPL CALC-SCNC: 7 MMOL/L (ref 7–16)
AST SERPL-CCNC: 36 U/L (ref 15–37)
BASOPHILS # BLD: 0.1 K/UL (ref 0–0.2)
BASOPHILS NFR BLD: 1 % (ref 0–2)
BILIRUB SERPL-MCNC: 0.4 MG/DL (ref 0.2–1.1)
BUN SERPL-MCNC: 7 MG/DL (ref 6–23)
CALCIUM SERPL-MCNC: 8.7 MG/DL (ref 8.3–10.4)
CHLORIDE SERPL-SCNC: 101 MMOL/L (ref 98–107)
CO2 SERPL-SCNC: 27 MMOL/L (ref 21–32)
CREAT SERPL-MCNC: 0.64 MG/DL (ref 0.8–1.5)
DIFFERENTIAL METHOD BLD: ABNORMAL
EOSINOPHIL # BLD: 0 K/UL (ref 0–0.8)
EOSINOPHIL NFR BLD: 0 % (ref 0.5–7.8)
ERYTHROCYTE [DISTWIDTH] IN BLOOD BY AUTOMATED COUNT: 12.6 %
GLOBULIN SER CALC-MCNC: 4.5 G/DL (ref 2.3–3.5)
GLUCOSE BLD STRIP.AUTO-MCNC: 143 MG/DL (ref 65–100)
GLUCOSE BLD STRIP.AUTO-MCNC: 275 MG/DL (ref 65–100)
GLUCOSE SERPL-MCNC: 155 MG/DL (ref 65–100)
HCT VFR BLD AUTO: 44.2 % (ref 41.1–50.3)
HGB BLD-MCNC: 15 G/DL (ref 13.6–17.2)
IMM GRANULOCYTES # BLD: 0 K/UL (ref 0–0.5)
IMM GRANULOCYTES NFR BLD AUTO: 0 % (ref 0–5)
LYMPHOCYTES # BLD: 2.1 K/UL (ref 0.5–4.6)
LYMPHOCYTES NFR BLD: 36 % (ref 13–44)
MCH RBC QN AUTO: 31.8 PG (ref 26.1–32.9)
MCHC RBC AUTO-ENTMCNC: 33.9 G/DL (ref 31.4–35)
MCV RBC AUTO: 93.8 FL (ref 79.6–97.8)
MM INDURATION POC: 0 MM (ref 0–5)
MONOCYTES # BLD: 0.7 K/UL (ref 0.1–1.3)
MONOCYTES NFR BLD: 12 % (ref 4–12)
NEUTS SEG # BLD: 3 K/UL (ref 1.7–8.2)
NEUTS SEG NFR BLD: 51 % (ref 43–78)
NRBC # BLD: 0 K/UL (ref 0–0.2)
PLATELET # BLD AUTO: 208 K/UL (ref 150–450)
PMV BLD AUTO: 10.4 FL (ref 9.4–12.3)
POTASSIUM SERPL-SCNC: 3.6 MMOL/L (ref 3.5–5.1)
PPD POC: NEGATIVE NEGATIVE
PROT SERPL-MCNC: 7.9 G/DL (ref 6.3–8.2)
RBC # BLD AUTO: 4.71 M/UL (ref 4.23–5.6)
SODIUM SERPL-SCNC: 135 MMOL/L (ref 136–145)
WBC # BLD AUTO: 5.8 K/UL (ref 4.3–11.1)

## 2018-09-23 PROCEDURE — 74011636637 HC RX REV CODE- 636/637: Performed by: INTERNAL MEDICINE

## 2018-09-23 PROCEDURE — 80053 COMPREHEN METABOLIC PANEL: CPT

## 2018-09-23 PROCEDURE — 85025 COMPLETE CBC W/AUTO DIFF WBC: CPT

## 2018-09-23 PROCEDURE — 82962 GLUCOSE BLOOD TEST: CPT

## 2018-09-23 PROCEDURE — 74011250637 HC RX REV CODE- 250/637: Performed by: INTERNAL MEDICINE

## 2018-09-23 PROCEDURE — 36415 COLL VENOUS BLD VENIPUNCTURE: CPT

## 2018-09-23 RX ORDER — CEFPODOXIME PROXETIL 100 MG/1
100 TABLET, FILM COATED ORAL EVERY 12 HOURS
Qty: 20 TAB | Refills: 0 | Status: SHIPPED | OUTPATIENT
Start: 2018-09-23 | End: 2018-10-03

## 2018-09-23 RX ORDER — INSULIN GLARGINE 100 [IU]/ML
10 INJECTION, SOLUTION SUBCUTANEOUS
Qty: 1 VIAL | Refills: 0 | Status: SHIPPED | OUTPATIENT
Start: 2018-09-23

## 2018-09-23 RX ADMIN — LORAZEPAM 1 MG: 1 TABLET ORAL at 08:10

## 2018-09-23 RX ADMIN — ONDANSETRON 4 MG: 4 TABLET, ORALLY DISINTEGRATING ORAL at 08:10

## 2018-09-23 RX ADMIN — LORAZEPAM 1 MG: 1 TABLET ORAL at 04:23

## 2018-09-23 RX ADMIN — CEFPODOXIME PROXETIL 100 MG: 200 TABLET, FILM COATED ORAL at 08:10

## 2018-09-23 RX ADMIN — INSULIN LISPRO 6 UNITS: 100 INJECTION, SOLUTION INTRAVENOUS; SUBCUTANEOUS at 12:10

## 2018-09-23 NOTE — PROGRESS NOTES
Pulled out IV. Unsuccessful attempts x 2. Refusing to have IV. Patient denies nausea and diarrhea. Eating and drinking without difficulty. Placed call to Dr. Genna Cotter. Per Dr. Genna Cotter, ok to leave IV out and stop fluids. Orders received to Oral PRN ativan and zofran.

## 2018-09-23 NOTE — PROGRESS NOTES
Discharged to [facility] [place], transported via stretcher by [facility]. Packet sent with [employer]. Condition stable at discharge

## 2018-09-23 NOTE — PROGRESS NOTES
Care Management Interventions Mode of Transport at Discharge: BLS [facility]) Transition of Care Consult (CM Consult): Other (Return to [facility] for in pt Psychiatric care under committment.) Discharge Durable Medical Equipment: No 
Physical Therapy Consult: Yes Occupational Therapy Consult: Yes Speech Therapy Consult: Yes 
Plan discussed with Pt/Family/Caregiver: Yes Discharge Location Discharge Placement:  (Pt D/C back to [facility] for [address] via [facility].  )

## 2018-09-23 NOTE — PROGRESS NOTES
Dr Jenniffer Smith present, advised to give another dose of ativan 1 mg PO now. Dr Jenniffer Smith notified dose given at X935

## 2018-09-23 NOTE — DISCHARGE SUMMARY
Hospitalist Discharge Summary     Admit Date:  2018  2:56 PM   Name:  Ping Almaguer   Age:  43 y.o.  :  1975   MRN:  [id number]   PCP:  Not On File [facility]  Treatment Team: Attending Provider: Gustavo Reid MD; Consulting Provider: Gustavo Reid MD; Care Manager: Kenyatta Feldman RN; Utilization Review: Natasha Phelps; Speech Language Pathologist: AMAN Castillo    Problem List for this Hospitalization:  Hospital Problems as of 2018  Never Reviewed          Codes Class Noted - Resolved POA    Hypoglycemia ICD-10-CM: E16.2  ICD-9-CM: 251.2  2018 - Present Unknown        Acute encephalopathy ICD-10-CM: G93.40  ICD-9-CM: 348.30  2018 - Present Yes        Elevated liver enzymes ICD-10-CM: R74.8  ICD-9-CM: 790.5  2018 - Present Yes        Type 2 diabetes mellitus ([facility] [address] (Chronic) ICD-10-CM: E11.9  ICD-9-CM: 250.00  2018 - Present Yes        Depression (Chronic) ICD-10-CM: F32.9  ICD-9-CM: 771  2018 - Present Yes        Hypercarbia ICD-10-CM: R06.89  ICD-9-CM: 786.09  2018 - Present Yes                    Hospital Course:      Mr. Astrid Marsh is a 42 yo male with PMH of anxiety/depression admitted to [facility] for suicidal ideation and xanax detox who is evaluated for the 2nd time in the ED with worsening confusion. He has apparently been weaned off xanax and then developed confusion with first ED workup being unrevealing with negative CT head, labs. He returned to [facility] and developed worsening confusion with hypoglycemia in the setting of DM2, he has been receiving insulin, prn ativan and haldol while at [facility]. He was  in 4 point restraints in the ED, agitated and kicking and yelling. Glucose has improved with D50 and on D10 IVF. He received IV thiamine prior to dextrose. UDS and  ETOH negative, asa/tylenol negative. He had mild leukocytosis, CXR negative, UA showed evidence of UTI- culture skin luis angel, He is day 3 antibiotics for UTI.  LFTS elevated- HIV negative and HEPC positive. EKG from 9-19  with normal QTC. He has improved regarding overall agitation/ confusion and was likely in benzodiazepine withdrawals; sitter present. He has been no longer requiring restraints and has haldol/ativan prn. Tele psyche consulted for medication assistance and agrees with haldol/ativan. Case management consulted. He will need to return to [facility] at discharge. Follow up instructions and discharge meds at bottom of this note. Plan was discussed with patient. All questions answered. Patient was stable at time of discharge. Diagnostic Imaging/Tests:   Ct Head Wo Cont    Result Date: 9/19/2018  Noncontrast CT of the brain. COMPARISON: none INDICATION: Delirium TECHNIQUE: Contiguous axial images were obtained from the skull base through the vertex without IV contrast. Radiation dose reduction techniques were used for this study:  Our CT scanners use one or all of the following: Automated exposure control, adjustment of the mA and/or kVp according to patient's size, iterative reconstruction. FINDINGS: There is no acute intracranial hemorrhage or evidence for acute territorial infarction. There is no mass effect, midline shift or hydrocephalus. There is no extra-axial fluid collection. The cerebellum and brainstem are grossly unremarkable. Included orbits and the globes are unremarkable. Postsurgical changes in the maxillary sinuses. Mastoid air cells are aerated. Surrounding bones are intact. IMPRESSION: 1. No evidence of acute intracranial abnormality. No hydrocephalus. Xr Chest Port    Result Date: 9/19/2018  Portable chest xray  COMPARISON: none. INDICATION: chest pain FINDINGS: No pulmonary consolidation, pleural effusion or pneumothorax. No pulmonary edema. Cardiomediastinal contour is within normal limits. Old healed left-sided rib fractures are noted. IMPRESSION: Negative chest x-ray.        Echocardiogram results:  No results found for this visit on 09/19/18.       All Micro Results     Procedure Component Value Units Date/Time    CULTURE, URINE [[id number]] Collected:  09/21/18 1615    Order Status:  Completed Specimen:  Urine from Clean catch Updated:  09/23/18 0709     Special Requests: NO SPECIAL REQUESTS        Culture result:       <10,000  COLONIES/mL  NORMAL SKIN BRIGID ISOLATED      CULTURE, BLOOD [[id number]] Collected:  09/19/18 2120    Order Status:  Completed Specimen:  Blood from Blood Updated:  09/23/18 0647     Special Requests: RIGHT HAND        Culture result: NO GROWTH 4 DAYS       CULTURE, STOOL [[id number]]     Order Status:  Sent Specimen:  Stool     C. DIFFICILE AG & TOXIN A/B [[id number]]     Order Status:  Sent Specimen:  Stool     CULTURE, BLOOD [[id number]] Collected:  09/19/18 1645    Order Status:  Canceled Specimen:  Blood from Blood           Labs: Results:       BMP, Mg, Phos Recent Labs      09/23/18   0503  09/22/18   0447  09/21/18   0356   NA  135*  137  137   K  3.6  3.4*  3.6   CL  101  103  102   CO2  27  27  25   AGAP  7  7  10   BUN  7  6  11   CREA  0.64*  0.63*  0.60*   CA  8.7  8.1*  8.3   GLU  155*  140*  118*      CBC Recent Labs      09/23/18   0503  09/22/18   0447  09/21/18   0356   WBC  5.8  5.6  10.0   RBC  4.71  4.47  4.59   HGB  15.0  14.3  14.7   HCT  44.2  42.0  43.9   PLT  208  156  165   GRANS  51  58  65   LYMPH  36  31  26   EOS  0*  0*  0*   MONOS  12  11  8   BASOS  1  1  0   IG  0  0  0   ANEU  3.0  3.3  6.6   ABL  2.1  1.7  2.6   TERI  0.0  0.0  0.0   ABM  0.7  0.6  0.8   ABB  0.1  0.0  0.0   AIG  0.0  0.0  0.0      LFT Recent Labs      09/23/18   0503  09/22/18 0447  09/21/18   0356   SGOT  36  38*  42*   ALT  57  56  65   AP  72  73  78   TP  7.9  7.4  7.7   ALB  3.4*  3.3*  3.5   GLOB  4.5*  4.1*  4.2*   AGRAT  0.8*  0.8*  0.8*      Cardiac Testing Lab Results   Component Value Date/Time     09/19/2018 05:58 AM      Coagulation Tests Lab Results   Component Value Date/Time    Prothrombin time 13.3 09/19/2018 09:20 PM    INR 1.1 09/19/2018 09:20 PM      A1c No results found for: HBA1C, HGBE8, [id number]   Lipid Panel No results found for: CHOL, CHOLPOCT, CHOLX, CHLST, CHOLV, 885290, HDL, LDL, LDLC, DLDLP, 872556, VLDLC, VLDL, TGLX, TRIGL, TRIGP, TGLPOCT, CHHD, [facility]   Thyroid Panel Lab Results   Component Value Date/Time    TSH 2.160 09/19/2018 09:20 PM        Most Recent UA Lab Results   Component Value Date/Time    Color YELLOW 09/21/2018 01:08 PM    Appearance CLEAR 09/21/2018 01:08 PM    Specific gravity 1.016 09/21/2018 01:08 PM    pH (UA) 6.0 09/21/2018 01:08 PM    Protein NEGATIVE  09/21/2018 01:08 PM    Glucose >1000 09/21/2018 01:08 PM    Ketone 15 (A) 09/21/2018 01:08 PM    Bilirubin NEGATIVE  09/21/2018 01:08 PM    Blood LARGE (A) 09/21/2018 01:08 PM    Urobilinogen 1.0 09/21/2018 01:08 PM    Nitrites NEGATIVE  09/21/2018 01:08 PM    Leukocyte Esterase SMALL (A) 09/21/2018 01:08 PM    WBC 20-50 09/21/2018 01:08 PM    RBC 20-50 09/21/2018 01:08 PM    Epithelial cells 0 09/21/2018 01:08 PM    Bacteria 0 09/21/2018 01:08 PM    Casts 0 09/21/2018 01:08 PM        Allergies   Allergen Reactions    Toradol [Ketorolac] Unknown (comments)    Tramadol Other (comments)     Immunization History   Administered Date(s) Administered    Influenza Vaccine (Quad) PF 09/21/2018    TB Skin Test (PPD) Intradermal 09/20/2018       All Labs from Last 24 Hrs:  Recent Results (from the past 24 hour(s))   GLUCOSE, POC    Collection Time: 09/22/18 11:40 AM   Result Value Ref Range    Glucose (POC) 216 (H) 65 - 100 mg/dL   GLUCOSE, POC    Collection Time: 09/22/18  4:05 PM   Result Value Ref Range    Glucose (POC) 239 (H) 65 - 100 mg/dL   GLUCOSE, POC    Collection Time: 09/22/18  9:07 PM   Result Value Ref Range    Glucose (POC) 206 (H) 65 - 100 mg/dL   PLEASE READ & DOCUMENT PPD TEST IN 72 HRS    Collection Time: 09/23/18  1:06 AM   Result Value Ref Range    PPD Negative Negative    mm Induration 0 mm   CBC WITH AUTOMATED DIFF    Collection Time: 09/23/18  5:03 AM   Result Value Ref Range    WBC 5.8 4.3 - 11.1 K/uL    RBC 4.71 4.23 - 5.6 M/uL    HGB 15.0 13.6 - 17.2 g/dL    HCT 44.2 41.1 - 50.3 %    MCV 93.8 79.6 - 97.8 FL    MCH 31.8 26.1 - 32.9 PG    MCHC 33.9 31.4 - 35.0 g/dL    RDW 12.6 %    PLATELET 259 441 - 558 K/uL    MPV 10.4 9.4 - 12.3 FL    ABSOLUTE NRBC 0.00 0.0 - 0.2 K/uL    DF AUTOMATED      NEUTROPHILS 51 43 - 78 %    LYMPHOCYTES 36 13 - 44 %    MONOCYTES 12 4.0 - 12.0 %    EOSINOPHILS 0 (L) 0.5 - 7.8 %    BASOPHILS 1 0.0 - 2.0 %    IMMATURE GRANULOCYTES 0 0.0 - 5.0 %    ABS. NEUTROPHILS 3.0 1.7 - 8.2 K/UL    ABS. LYMPHOCYTES 2.1 0.5 - 4.6 K/UL    ABS. MONOCYTES 0.7 0.1 - 1.3 K/UL    ABS. EOSINOPHILS 0.0 0.0 - 0.8 K/UL    ABS. BASOPHILS 0.1 0.0 - 0.2 K/UL    ABS. IMM. GRANS. 0.0 0.0 - 0.5 K/UL   METABOLIC PANEL, COMPREHENSIVE    Collection Time: 09/23/18  5:03 AM   Result Value Ref Range    Sodium 135 (L) 136 - 145 mmol/L    Potassium 3.6 3.5 - 5.1 mmol/L    Chloride 101 98 - 107 mmol/L    CO2 27 21 - 32 mmol/L    Anion gap 7 7 - 16 mmol/L    Glucose 155 (H) 65 - 100 mg/dL    BUN 7 6 - 23 MG/DL    Creatinine 0.64 (L) 0.8 - 1.5 MG/DL    GFR est AA >60 >60 ml/min/1.73m2    GFR est non-AA >60 >60 ml/min/1.73m2    Calcium 8.7 8.3 - 10.4 MG/DL    Bilirubin, total 0.4 0.2 - 1.1 MG/DL    ALT (SGPT) 57 12 - 65 U/L    AST (SGOT) 36 15 - 37 U/L    Alk.  phosphatase 72 50 - 136 U/L    Protein, total 7.9 6.3 - 8.2 g/dL    Albumin 3.4 (L) 3.5 - 5.0 g/dL    Globulin 4.5 (H) 2.3 - 3.5 g/dL    A-G Ratio 0.8 (L) 1.2 - 3.5     GLUCOSE, POC    Collection Time: 09/23/18  6:02 AM   Result Value Ref Range    Glucose (POC) 143 (H) 65 - 100 mg/dL       Current Med List in Hospital:   Current Facility-Administered Medications   Medication Dose Route Frequency    cefpodoxime (VANTIN) tablet 100 mg  100 mg Oral Q12H    LORazepam (ATIVAN) tablet 1 mg  1 mg Oral Q4H PRN    ondansetron (ZOFRAN ODT) tablet 4 mg  4 mg Oral Q4H PRN    insulin glargine (LANTUS) injection 10 Units  10 Units SubCUTAneous QHS    acetaminophen (TYLENOL) tablet 500 mg  500 mg Oral Q6H PRN    insulin lispro (HUMALOG) injection   SubCUTAneous AC&HS    sodium chloride (NS) flush 5-10 mL  5-10 mL IntraVENous PRN    sodium chloride (NS) flush 5-10 mL  5-10 mL IntraVENous PRN    ondansetron (ZOFRAN) injection 4 mg  4 mg IntraVENous Q4H PRN    enoxaparin (LOVENOX) injection 40 mg  40 mg SubCUTAneous Q24H    LORazepam (ATIVAN) injection 1 mg  1 mg IntraVENous Q4H PRN    haloperidol lactate (HALDOL) injection 5 mg  5 mg IntraMUSCular Q8H PRN       Discharge Exam:  Patient Vitals for the past 24 hrs:   Temp Pulse Resp BP SpO2   09/23/18 0710 97.3 °F (36.3 °C) 76 18 132/88 96 %   09/23/18 0328 98.1 °F (36.7 °C) 93 18 138/89 97 %   09/22/18 2324 99 °F (37.2 °C) 91 18 145/85 97 %   09/22/18 1940 98.2 °F (36.8 °C) (!) 103 18 131/85 92 %   09/22/18 1446 98.4 °F (36.9 °C) 93 18 133/86 91 %   09/22/18 1100 98 °F (36.7 °C) 97 18 135/80 95 %     Oxygen Therapy  O2 Sat (%): 96 % (09/23/18 0710)  Pulse via Oximetry: 106 beats per minute (09/19/18 1725)  O2 Device: Room air (09/22/18 0725)    Intake/Output Summary (Last 24 hours) at 09/23/18 0816  Last data filed at 09/22/18 1446   Gross per 24 hour   Intake              644 ml   Output                0 ml   Net              644 ml         General:                    Well nourished. Alert. Calm and verbal  CV:                                      RRR. No murmur, rub, or gallop. No edema  Lungs:                       CTAB. No wheezing, rhonchi, or rales. Abdomen:                  Soft, nontender, nondistended. obese  Extremities:               Warm and dry. Skin:                                    No rashes or jaundice.    Neuro:                                 No gross focal deficits  Psyche:                    calm                    Discharge Info:   Current Discharge Medication List      START taking these medications Details   insulin glargine (LANTUS) 100 unit/mL injection 10 Units by SubCUTAneous route nightly. Qty: 1 Vial, Refills: 0      cefpodoxime (VANTIN) 100 mg tablet Take 1 Tab by mouth every twelve (12) hours for 10 days. Qty: 20 Tab, Refills: 0               Disposition: [facility]  Activity: Activity as tolerated  Diet: DIET DIABETIC CONSISTENT CARB Regular    Follow-up Appointments   Procedures    FOLLOW UP VISIT Appointment in: Other ([facility]) After [facility] discharge     After [facility] discharge     Standing Status:   Standing     Number of Occurrences:   1     Order Specific Question:   Appointment in     Answer: Other (Specify)         Follow-up Information     Follow up With Details Comments Contact Info    Not On File [facility]   Not On File (62) Patient has a PCP but that physician is not listed in [facility]. Time spent in patient discharge planning and coordination 30 minutes.     Signed:  Alicia Delacruz MD

## 2018-09-24 VITALS
DIASTOLIC BLOOD PRESSURE: 88 MMHG | WEIGHT: 180 LBS | RESPIRATION RATE: 18 BRPM | TEMPERATURE: 108 F | BODY MASS INDEX: 28.93 KG/M2 | HEART RATE: 91 BPM | HEIGHT: 66 IN | OXYGEN SATURATION: 97 % | SYSTOLIC BLOOD PRESSURE: 134 MMHG

## 2018-09-24 LAB
BACTERIA SPEC CULT: NORMAL
BACTERIA SPEC CULT: NORMAL
SERVICE CMNT-IMP: NORMAL
SERVICE CMNT-IMP: NORMAL

## 2018-11-10 ENCOUNTER — HOSPITAL ENCOUNTER (EMERGENCY)
Age: 43
Discharge: HOME | End: 2018-11-10
Attending: EMERGENCY MEDICINE
Payer: MEDICARE

## 2018-11-10 VITALS
BODY MASS INDEX: 28.12 KG/M2 | HEIGHT: 66 IN | DIASTOLIC BLOOD PRESSURE: 79 MMHG | SYSTOLIC BLOOD PRESSURE: 122 MMHG | WEIGHT: 175 LBS | HEART RATE: 79 BPM | OXYGEN SATURATION: 94 % | RESPIRATION RATE: 20 BRPM | TEMPERATURE: 98 F

## 2018-11-10 DIAGNOSIS — R73.9 HYPERGLYCEMIA: Primary | ICD-10-CM

## 2018-11-10 LAB
ALBUMIN SERPL-MCNC: 3.1 G/DL (ref 3.5–5)
ALBUMIN/GLOB SERPL: 0.8 {RATIO}
ALP SERPL-CCNC: 130 U/L (ref 50–136)
ALT SERPL-CCNC: 111 U/L (ref 12–65)
ANION GAP SERPL CALC-SCNC: 7 MMOL/L
AST SERPL-CCNC: 55 U/L (ref 15–37)
BASOPHILS # BLD: 0 K/UL (ref 0–0.2)
BASOPHILS NFR BLD: 1 % (ref 0–2)
BILIRUB SERPL-MCNC: 0.3 MG/DL (ref 0.2–1.1)
BUN SERPL-MCNC: 15 MG/DL (ref 6–23)
CALCIUM SERPL-MCNC: 8.9 MG/DL (ref 8.3–10.4)
CHLORIDE SERPL-SCNC: 96 MMOL/L (ref 98–107)
CO2 SERPL-SCNC: 31 MMOL/L (ref 21–32)
CREAT SERPL-MCNC: 0.79 MG/DL (ref 0.8–1.5)
DIFFERENTIAL METHOD BLD: ABNORMAL
EOSINOPHIL # BLD: 0 K/UL (ref 0–0.8)
EOSINOPHIL NFR BLD: 0 % (ref 0.5–7.8)
ERYTHROCYTE [DISTWIDTH] IN BLOOD BY AUTOMATED COUNT: 12.8 %
GLOBULIN SER CALC-MCNC: 4.1 G/DL (ref 2.3–3.5)
GLUCOSE BLD STRIP.AUTO-MCNC: 194 MG/DL (ref 65–100)
GLUCOSE BLD STRIP.AUTO-MCNC: 271 MG/DL (ref 65–100)
GLUCOSE SERPL-MCNC: 309 MG/DL (ref 65–100)
HCT VFR BLD AUTO: 45.8 % (ref 41.1–50.3)
HGB BLD-MCNC: 15.3 G/DL (ref 13.6–17.2)
IMM GRANULOCYTES # BLD: 0 K/UL (ref 0–0.5)
IMM GRANULOCYTES NFR BLD AUTO: 1 % (ref 0–5)
LYMPHOCYTES # BLD: 2.1 K/UL (ref 0.5–4.6)
LYMPHOCYTES NFR BLD: 37 % (ref 13–44)
MCH RBC QN AUTO: 31.3 PG (ref 26.1–32.9)
MCHC RBC AUTO-ENTMCNC: 33.4 G/DL (ref 31.4–35)
MCV RBC AUTO: 93.7 FL (ref 79.6–97.8)
MONOCYTES # BLD: 0.6 K/UL (ref 0.1–1.3)
MONOCYTES NFR BLD: 10 % (ref 4–12)
NEUTS SEG # BLD: 2.8 K/UL (ref 1.7–8.2)
NEUTS SEG NFR BLD: 51 % (ref 43–78)
NRBC # BLD: 0 K/UL (ref 0–0.2)
PLATELET # BLD AUTO: 152 K/UL (ref 150–450)
PMV BLD AUTO: 10.2 FL (ref 9.4–12.3)
POTASSIUM SERPL-SCNC: 4.4 MMOL/L (ref 3.5–5.1)
PROT SERPL-MCNC: 7.2 G/DL
RBC # BLD AUTO: 4.89 M/UL (ref 4.23–5.6)
SODIUM SERPL-SCNC: 134 MMOL/L (ref 136–145)
WBC # BLD AUTO: 5.5 K/UL (ref 4.3–11.1)

## 2018-11-10 PROCEDURE — 99285 EMERGENCY DEPT VISIT HI MDM: CPT | Performed by: EMERGENCY MEDICINE

## 2018-11-10 PROCEDURE — 74011636637 HC RX REV CODE- 636/637: Performed by: EMERGENCY MEDICINE

## 2018-11-10 PROCEDURE — 96374 THER/PROPH/DIAG INJ IV PUSH: CPT | Performed by: EMERGENCY MEDICINE

## 2018-11-10 PROCEDURE — 80053 COMPREHEN METABOLIC PANEL: CPT

## 2018-11-10 PROCEDURE — 81003 URINALYSIS AUTO W/O SCOPE: CPT | Performed by: EMERGENCY MEDICINE

## 2018-11-10 PROCEDURE — 85025 COMPLETE CBC W/AUTO DIFF WBC: CPT

## 2018-11-10 PROCEDURE — 74011250636 HC RX REV CODE- 250/636: Performed by: EMERGENCY MEDICINE

## 2018-11-10 PROCEDURE — 82962 GLUCOSE BLOOD TEST: CPT

## 2018-11-10 RX ORDER — SODIUM CHLORIDE 0.9 % (FLUSH) 0.9 %
5-10 SYRINGE (ML) INJECTION AS NEEDED
Status: DISCONTINUED | OUTPATIENT
Start: 2018-11-10 | End: 2018-11-10 | Stop reason: HOSPADM

## 2018-11-10 RX ORDER — SODIUM CHLORIDE 0.9 % (FLUSH) 0.9 %
5-10 SYRINGE (ML) INJECTION EVERY 8 HOURS
Status: DISCONTINUED | OUTPATIENT
Start: 2018-11-10 | End: 2018-11-10 | Stop reason: HOSPADM

## 2018-11-10 RX ADMIN — INSULIN HUMAN 5 UNITS: 100 INJECTION, SOLUTION PARENTERAL at 10:21

## 2018-11-10 RX ADMIN — SODIUM CHLORIDE 1000 ML: 900 INJECTION, SOLUTION INTRAVENOUS at 09:29
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
## 2018-11-10 NOTE — ED NOTES
I have reviewed discharge instructions with the patient and caregiver via phone [place] at [facility] [address]. The patient and caregiver verbalized understanding. Patient left ED via Discharge Method: stretcher to [facility] with [facility]. Opportunity for questions and clarification provided. Patient given 0 scripts. To continue your aftercare when you leave the hospital, you may receive an automated call from our care team to check in on how you are doing. This is a free service and part of our promise to provide the best care and service to meet your aftercare needs.  If you have questions, or wish to unsubscribe from this service please call [phone]. Thank you for Choosing our [facility] Emergency Department.

## 2018-11-10 NOTE — DISCHARGE INSTRUCTIONS
Learning About High Blood Sugar  What is high blood sugar? Your body turns the food you eat into glucose (sugar), which it uses for energy. But if your body isn't able to use the sugar right away, it can build up in your blood and lead to high blood sugar. When the amount of sugar in your blood stays too high for too much of the time, you may have diabetes. Diabetes is a disease that can cause serious health problems. The good news is that lifestyle changes may help you get your blood sugar back to normal and avoid or delay diabetes. What causes high blood sugar? Sugar (glucose) can build up in your blood if you:  · Are overweight. · Have a family history of diabetes. · Take certain medicines, such as steroids. What are the symptoms? Having high blood sugar may not cause any symptoms at all. Or it may make you feel very thirsty or very hungry. You may also urinate more often than usual, have blurry vision, or lose weight without trying. How is high blood sugar treated? You can take steps to lower your blood sugar level if you understand what makes it get higher. Your doctor may want you to learn how to test your blood sugar level at home. Then you can see how illness, stress, or different kinds of food or medicine raise or lower your blood sugar level. Other tests may be needed to see if you have diabetes. How can you prevent high blood sugar? · Watch your weight. If you're overweight, losing just a small amount of weight may help. Reducing fat around your waist is most important. · Limit the amount of calories, sweets, and unhealthy fat you eat. Ask your doctor if a dietitian can help you. A registered dietitian can help you create meal plans that fit your lifestyle. · Get at least 30 minutes of exercise on most days of the week. Exercise helps control your blood sugar. It also helps you maintain a healthy weight. Walking is a good choice.  You also may want to do other activities, such as running, swimming, cycling, or playing tennis or team sports. · If your doctor prescribed medicines, take them exactly as prescribed. Call your doctor if you think you are having a problem with your medicine. You will get more details on the specific medicines your doctor prescribes. Follow-up care is a key part of your treatment and safety. Be sure to make and go to all appointments, and call your doctor if you are having problems. It's also a good idea to know your test results and keep a list of the medicines you take. Where can you learn more? Go to http://elenita-akash.info/. Enter O108 in the search box to learn more about \"Learning About High Blood Sugar. \"  Current as of: December 7, 2017  Content Version: 11.8  © 1264-8738 [employer]. Care instructions adapted under license by [employer] (which disclaims liability or warranty for this information). If you have questions about a medical condition or this instruction, always ask your healthcare professional. [address] any warranty or liability for your use of this information.

## 2018-11-10 NOTE — ED TRIAGE NOTES
Patient with elevated blood sugar for a few days while at [facility], noted with BGL at 496 mg/dl with EMS. Patient is complaining of chronic neck and back pain. Patient with some nausea or vomiting. Patient advises at [facility] for suicidal thoughts and drug rehab, denies any sucidal thoughts at this time.